# Patient Record
Sex: FEMALE | Race: WHITE | NOT HISPANIC OR LATINO
[De-identification: names, ages, dates, MRNs, and addresses within clinical notes are randomized per-mention and may not be internally consistent; named-entity substitution may affect disease eponyms.]

---

## 2017-11-07 ENCOUNTER — TRANSCRIPTION ENCOUNTER (OUTPATIENT)
Age: 67
End: 2017-11-07

## 2017-12-06 ENCOUNTER — HOSPITAL ENCOUNTER (OUTPATIENT)
Dept: HOSPITAL 74 - FASU | Age: 67
Discharge: HOME | End: 2017-12-06
Attending: OPHTHALMOLOGY
Payer: COMMERCIAL

## 2017-12-06 VITALS — HEART RATE: 62 BPM | DIASTOLIC BLOOD PRESSURE: 61 MMHG | SYSTOLIC BLOOD PRESSURE: 127 MMHG

## 2017-12-06 VITALS — TEMPERATURE: 98 F

## 2017-12-06 VITALS — BODY MASS INDEX: 28.8 KG/M2

## 2017-12-06 DIAGNOSIS — H26.8: Primary | ICD-10-CM

## 2017-12-06 PROCEDURE — 08RJ3JZ REPLACEMENT OF RIGHT LENS WITH SYNTHETIC SUBSTITUTE, PERCUTANEOUS APPROACH: ICD-10-PCS | Performed by: OPHTHALMOLOGY

## 2017-12-06 RX ADMIN — TROPICAMIDE ONE DROP: 10 SOLUTION/ DROPS OPHTHALMIC at 07:10

## 2017-12-06 RX ADMIN — CIPROFLOXACIN HYDROCHLORIDE ONE DROP: 3 SOLUTION/ DROPS OPHTHALMIC at 07:20

## 2017-12-06 RX ADMIN — CYCLOPENTOLATE HYDROCHLORIDE ONE DROP: 20 SOLUTION/ DROPS OPHTHALMIC at 07:15

## 2017-12-06 RX ADMIN — TROPICAMIDE ONE DROP: 10 SOLUTION/ DROPS OPHTHALMIC at 07:20

## 2017-12-06 RX ADMIN — CIPROFLOXACIN HYDROCHLORIDE ONE DROP: 3 SOLUTION/ DROPS OPHTHALMIC at 07:15

## 2017-12-06 RX ADMIN — CYCLOPENTOLATE HYDROCHLORIDE ONE DROP: 20 SOLUTION/ DROPS OPHTHALMIC at 07:10

## 2017-12-06 RX ADMIN — CYCLOPENTOLATE HYDROCHLORIDE ONE DROP: 20 SOLUTION/ DROPS OPHTHALMIC at 07:20

## 2017-12-06 RX ADMIN — PHENYLEPHRINE HYDROCHLORIDE ONE DROP: 0.25 SPRAY NASAL at 07:15

## 2017-12-06 RX ADMIN — PHENYLEPHRINE HYDROCHLORIDE ONE DROP: 0.25 SPRAY NASAL at 07:10

## 2017-12-06 RX ADMIN — PHENYLEPHRINE HYDROCHLORIDE ONE DROP: 0.25 SPRAY NASAL at 07:20

## 2017-12-06 RX ADMIN — TROPICAMIDE ONE DROP: 10 SOLUTION/ DROPS OPHTHALMIC at 07:15

## 2017-12-06 RX ADMIN — CIPROFLOXACIN HYDROCHLORIDE ONE DROP: 3 SOLUTION/ DROPS OPHTHALMIC at 07:10

## 2017-12-06 NOTE — OP
DATE OF OPERATION:  12/06/2017

 

OPERATIVE PROCEDURE:  Lens Phacoemulsification with Posterior Chamber Intraocular

Lens Placement Right Eye

 

PREOPERATIVE DIAGNOSIS:  Visually Significant Cataract of Right Eye

 

POSTOPERATIVE DIAGNOSIS:  Visually Significant Cataract of Right Eye

 

SURGEON:  Jerry Mijares M.D.

 

ANESTHESIA:  MAC

 

ANESTHESIOLOGIST: 

 

PROCEDURE:  The patient was brought to the operating room and placed under monitored

anesthesia care by Anesthesia.  A drop of Tetracaine was then placed over the right

eye.  The patient was then prepped and draped in the usual sterile manner.  A

speculum was then placed over the right eye. The eye was then well irrigated with

copious amounts of BSS (balanced salt solution). 

 

The operating microscope was then moved into position.  A paracentesis was performed

using a 15 degree blade.  At this point 0.5 mL of 1% preservative-free lidocaine was

injected into the anterior chamber.  Amvisc plus was then injected into the anterior

chamber.  A clear corneal incision was then formed using a 2.2 mm keratome. A

capsulorrhexis was then performed in a continuous circular fashion beginning with a

cystotome, completed with an Utratas forceps. Hydrodissection was then performed

using BSS on a cannula. The phaco probe was then introduced through the corneal wound

and the cataract was removed using the phaco chop technique.  Approximately 3 seconds

of absolute phaco time was used.  The remaining cortex was then removed using

irrigation and aspiration with an I/A probe. The capsule was then filled with regular

Amvisc and the capsule was noted to be intact.  A previously selected foldable

posterior chamber intraocular lens was then injected into the capsule through the

corneal wound using a lens injector.  It was then dialed into position using a

Sinskey hook.  The Amvisc was then removed using irrigation and aspiration.  Miostat

was then injected through the paracentesis to constrict the pupil.  The paracentesis

and corneal wound were then hydrated and noted to be water tight. A drop of Maxitrol

was then placed over the eye.  The speculum was removed and clear shield was taped

over the eye. 

 

The patient tolerated the procedure well and there were no surgical complications.

The patient was asked to follow up in my office the next day.  

 

 

JERRY MIJARES M.D.

 

[id number]

DD: 12/06/2017 08:15

DT: 12/06/2017 16:05

Job #:  [id number]

## 2018-01-10 ENCOUNTER — HOSPITAL ENCOUNTER (OUTPATIENT)
Dept: HOSPITAL 74 - FASU | Age: 68
Discharge: HOME | End: 2018-01-10
Attending: OPHTHALMOLOGY
Payer: COMMERCIAL

## 2018-01-10 VITALS — SYSTOLIC BLOOD PRESSURE: 121 MMHG | DIASTOLIC BLOOD PRESSURE: 63 MMHG | HEART RATE: 64 BPM

## 2018-01-10 VITALS — BODY MASS INDEX: 28.8 KG/M2

## 2018-01-10 VITALS — TEMPERATURE: 97.7 F

## 2018-01-10 DIAGNOSIS — H26.8: Primary | ICD-10-CM

## 2018-01-10 PROCEDURE — 08RK3JZ REPLACEMENT OF LEFT LENS WITH SYNTHETIC SUBSTITUTE, PERCUTANEOUS APPROACH: ICD-10-PCS | Performed by: OPHTHALMOLOGY

## 2018-01-10 RX ADMIN — CIPROFLOXACIN HYDROCHLORIDE ONE DROP: 3 SOLUTION/ DROPS OPHTHALMIC at 07:55

## 2018-01-10 RX ADMIN — CYCLOPENTOLATE HYDROCHLORIDE ONE DROP: 20 SOLUTION/ DROPS OPHTHALMIC at 08:00

## 2018-01-10 RX ADMIN — TROPICAMIDE ONE DROP: 10 SOLUTION/ DROPS OPHTHALMIC at 07:55

## 2018-01-10 RX ADMIN — CYCLOPENTOLATE HYDROCHLORIDE ONE DROP: 20 SOLUTION/ DROPS OPHTHALMIC at 07:50

## 2018-01-10 RX ADMIN — CYCLOPENTOLATE HYDROCHLORIDE ONE DROP: 20 SOLUTION/ DROPS OPHTHALMIC at 07:55

## 2018-01-10 RX ADMIN — CIPROFLOXACIN HYDROCHLORIDE ONE DROP: 3 SOLUTION/ DROPS OPHTHALMIC at 07:50

## 2018-01-10 RX ADMIN — PHENYLEPHRINE HYDROCHLORIDE ONE DROP: 0.25 SPRAY NASAL at 07:50

## 2018-01-10 RX ADMIN — PHENYLEPHRINE HYDROCHLORIDE ONE DROP: 0.25 SPRAY NASAL at 08:00

## 2018-01-10 RX ADMIN — PHENYLEPHRINE HYDROCHLORIDE ONE DROP: 0.25 SPRAY NASAL at 07:55

## 2018-01-10 RX ADMIN — TROPICAMIDE ONE DROP: 10 SOLUTION/ DROPS OPHTHALMIC at 07:50

## 2018-01-10 RX ADMIN — CIPROFLOXACIN HYDROCHLORIDE ONE DROP: 3 SOLUTION/ DROPS OPHTHALMIC at 08:00

## 2018-01-10 RX ADMIN — TROPICAMIDE ONE DROP: 10 SOLUTION/ DROPS OPHTHALMIC at 08:00

## 2018-01-10 NOTE — OP
DATE OF OPERATION:  01/10/2018

 

OPERATIVE PROCEDURE:  Lens Phacoemulsification with Posterior Chamber Intraocular

Lens Placement Left Eye

 

PREOPERATIVE DIAGNOSIS:  Visually Significant Cataract of Left Eye

 

POSTOPERATIVE DIAGNOSIS:  Visually Significant Cataract of Left Eye

 

SURGEON:  Jerry Mijares M.D.

 

ANESTHESIA:  MAC

 

ANESTHESIOLOGIST: 

 

PROCEDURE:  The patient was brought to the operating room and placed under monitored

anesthesia care by Anesthesia.  A drop of Tetracaine was then placed over the left

eye.  The patient was then prepped and draped in the usual sterile manner.  A

speculum was then placed over the left eye. The eye was then well irrigated with

copious amounts of BSS (balanced salt solution). 

 

The operating microscope was then moved into position.  A paracentesis was performed

using a 15 degree blade.  At this point 0.5 mL of 1% preservative-free lidocaine was

injected into the anterior chamber.  Amvisc plus was then injected into the anterior

chamber.  A clear corneal incision was then formed using a 2.2 mm keratome. A

capsulorrhexis was then performed in a continuous circular fashion beginning with a

cystotome, completed with an Utratas forceps. Hydrodissection was then performed

using BSS on a cannula. The phaco probe was then introduced through the corneal wound

and the cataract was removed using the phaco chop technique.  Approximately 3 seconds

of absolute phaco time was used.  The remaining cortex was then removed using

irrigation and aspiration with an I/A probe. The capsule was then filled with regular

Amvisc and the capsule was noted to be intact.  A previously selected foldable

posterior chamber intraocular lens was then injected into the capsule through the

corneal wound using a lens injector.  It was then dialed into position using a

Sinskey hook.  The Amvisc was then removed using irrigation and aspiration.  Miostat

was then injected through the paracentesis to constrict the pupil.  The paracentesis

and corneal wound were then hydrated and noted to be water tight. A drop of Maxitrol

was then placed over the eye.  The speculum was removed and clear shield was taped

over the eye. 

 

The patient tolerated the procedure well and there were no surgical complications.

The patient was asked to follow up in my office the next day.

 

 

JERRY MIJARES M.D.

 

[id number]

DD: 01/10/2018 09:46

DT: 01/10/2018 13:26

Job #:  [id number]

## 2019-06-21 ENCOUNTER — HOSPITAL ENCOUNTER (OUTPATIENT)
Dept: HOSPITAL 74 - JASU-ENDO | Age: 69
Discharge: HOME | End: 2019-06-21
Attending: INTERNAL MEDICINE
Payer: COMMERCIAL

## 2019-06-21 VITALS — SYSTOLIC BLOOD PRESSURE: 112 MMHG | HEART RATE: 59 BPM | DIASTOLIC BLOOD PRESSURE: 63 MMHG

## 2019-06-21 VITALS — BODY MASS INDEX: 29.9 KG/M2

## 2019-06-21 VITALS — TEMPERATURE: 97.8 F

## 2019-06-21 DIAGNOSIS — K64.8: ICD-10-CM

## 2019-06-21 DIAGNOSIS — Z71.3: ICD-10-CM

## 2019-06-21 DIAGNOSIS — Z79.84: ICD-10-CM

## 2019-06-21 DIAGNOSIS — K21.9: ICD-10-CM

## 2019-06-21 DIAGNOSIS — E66.9: ICD-10-CM

## 2019-06-21 DIAGNOSIS — D12.2: Primary | ICD-10-CM

## 2019-06-21 DIAGNOSIS — K57.30: ICD-10-CM

## 2019-06-21 DIAGNOSIS — E11.9: ICD-10-CM

## 2019-06-21 DIAGNOSIS — Z98.0: ICD-10-CM

## 2019-06-21 DIAGNOSIS — K62.89: ICD-10-CM

## 2019-06-21 DIAGNOSIS — J44.9: ICD-10-CM

## 2019-06-21 PROCEDURE — 0DBP8ZX EXCISION OF RECTUM, VIA NATURAL OR ARTIFICIAL OPENING ENDOSCOPIC, DIAGNOSTIC: ICD-10-PCS | Performed by: INTERNAL MEDICINE

## 2019-06-21 PROCEDURE — 0DBK8ZX EXCISION OF ASCENDING COLON, VIA NATURAL OR ARTIFICIAL OPENING ENDOSCOPIC, DIAGNOSTIC: ICD-10-PCS | Performed by: INTERNAL MEDICINE

## 2019-06-24 NOTE — PATH
Surgical Pathology Report



Patient Name:  PUSHPA JORDAN

Accession #:  [id number]

[facility]. Rec. #:  [id number]                                                        

   /Age/Gender:  1950 (Age: 69) / F

Account:  [id number]                                                          

             Location: ASU-ENDOSCOPY

Taken:  2019

Received:  2019

Reported:  2019

Physicians:  Lesly Patterson M.D.

  



Specimen(s) Received

A: RIGHT COLON POLYP 

B: BX SIGMOID ANASTOMOSIS 

C: BX RECTUM 





Clinical History

Proctitis, abdominal pain, rule out Colitis

Postoperative diagnosis: Diverticulosis, proctitis, right colon polyp, patent

sigmoid anastomosis







Final Diagnosis

A. RIGHT COLON POLYP, BIOPSY:

POLYPOID COLONIC MUCOSA WITH FOCAL SURFACE HYPERPLASTIC CHANGE AND REACTIVE

LYMPHOID AGGREGATES IN THE LAMINA PROPRIA.



B. SIGMOID ANASTOMOSIS, BIOPSY:

FRAGMENTS OF COLONIC MUCOSA WITH FOCAL REACTIVE LYMPHOID AGGREGATE.



C. PROCTITIS, BIOPSY:

COLONIC MUCOSA WITH ACTIVE CHRONIC INFLAMMATION, ACUTE CRYPTITIS, ARCHITECTURAL

DISTORTION, AND REACTIVE LYMPHOID FOLLICLES. SEE COMMENT.



Comment: There is no dysplasia, granuloma, parasite or viral inclusions. The

above histologic features are consistent with active chronic idiopathic

inflammatory bowel disease. This may also be seen in chronic enteric infections,

drug reactions, and parasitic infections. Clinical correlation recommended.









***Electronically Signed***

Lu Rios M.D.





Gross Description

A.  Received in formalin, labeled "biopsy right colon polyp" are 2 tan,

irregular portions of soft tissue averaging 0.4 cm. in greatest dimension. The

specimens are submitted in toto in one cassette.



B.  Received in formalin, labeled "biopsy sigmoid anastomosis" are 4 tan,

irregular portions of soft tissue averaging 0.3 cm. in greatest dimension. The

specimens are submitted in toto in one cassette.



C.  Received in formalin, labeled "biopsy proctitis" are 2 tan, irregular

portions of soft tissue measuring 0.2 and 0.3 cm. in greatest dimension. The

specimens are submitted in toto in one cassette.

## 2019-12-04 ENCOUNTER — HOSPITAL ENCOUNTER (EMERGENCY)
Dept: HOSPITAL 74 - JERFT | Age: 69
Discharge: HOME | End: 2019-12-04
Payer: COMMERCIAL

## 2019-12-04 VITALS — HEART RATE: 56 BPM | TEMPERATURE: 97.5 F | DIASTOLIC BLOOD PRESSURE: 66 MMHG | SYSTOLIC BLOOD PRESSURE: 145 MMHG

## 2019-12-04 VITALS — BODY MASS INDEX: 29.1 KG/M2

## 2019-12-04 DIAGNOSIS — K08.89: Primary | ICD-10-CM

## 2019-12-04 DIAGNOSIS — K92.9: ICD-10-CM

## 2019-12-04 DIAGNOSIS — J42: ICD-10-CM

## 2019-12-04 DIAGNOSIS — Z85.118: ICD-10-CM

## 2019-12-04 DIAGNOSIS — Z79.84: ICD-10-CM

## 2019-12-04 DIAGNOSIS — E11.9: ICD-10-CM

## 2019-12-04 DIAGNOSIS — K21.9: ICD-10-CM

## 2019-12-04 DIAGNOSIS — Z87.891: ICD-10-CM

## 2019-12-04 NOTE — PDOC
History of Present Illness





- General


Chief Complaint: Pain


Stated Complaint: SINUS INFECTION


Time Seen by Provider: 12/04/19 14:58


History Source: Patient





- History of Present Illness


Timing/Duration: other





Past History





- Past Medical History


Allergies/Adverse Reactions: 


 Allergies











Allergy/AdvReac Type Severity Reaction Status Date / Time


 


No Known Drug Allergies Allergy   Verified 12/04/19 15:01











Home Medications: 


Ambulatory Orders





Fluoxetine HCl [Prozac -] 10 mg PO BID #0 capsule 03/18/13 


Montelukast Na [Singulair -] 10 mg PO DAILY #0 tablet 03/18/13 


Sotalol HCl [Sotalol] 40 mg PO BID #0 tablet 03/18/13 


Ezetimibe [Zetia -] 10 mg PO ASDIR 07/16/15 


Simvastatin [Zocor -] 40 mg PO HS 07/16/15 


Ascorbic Acid [Vitamin C] 500 mg PO DAILY 09/22/16 


Cholecalciferol (Vitamin D3) [Vitamin D -] 2,000 unit PO DAILY 09/22/16 


Mesalamine [Apriso] 4 each PO DAILY 09/22/16 


Multivitamin with Minerals [Icaps Plus] 1 each PO DAILY 09/22/16 


metFORMIN HCL [Metformin HCl] 500 mg PO DAILY 09/22/16 


Ranitidine [Zantac -] 150 mg PO BID #180 tablet 09/23/16 








Anemia: No


Asthma: No


Cancer: Yes (RT LOBULAR CARCINOMA)


Cardiac Disorders: Yes (MVP, HX A-FIB & SVT)


CVA: No


COPD: No (CHRONIC BRONCHITIS)


CHF: No


Dementia: No


Diabetes: Yes (NIDDM)


GI Disorders: Yes (COLITIS, GERD,HYPERPLASTIC POLYP, ANAL FISSURE,)


 Disorders: No


HTN: No


Hypercholesterolemia: Yes


Liver Disease: No


Seizures: No


Thyroid Disease: No





- Surgical History


Abdominal Surgery: No


Appendectomy: Yes


Cardiac Surgery: Yes (ABLATION- 1995)


Cholecystectomy: Yes (LAPAROSCOPIC)


Lung Surgery: No


Neurologic Surgery: No


Orthopedic Surgery: No





- Psycho Social/Smoking Cessation Hx


Smoking Status: Yes


Smoking History: Never smoked


Have you smoked in the past 12 months: No


Number of Cigarettes Smoked Daily: 0


If you are a former smoker, when did you quit?: 30YRS AGO


'Breaking Loose' booklet given: 04/27/12


Hx Alcohol Use: Yes (RARE)


Drug/Substance Use Hx: No


Substance Use Type: Alcohol


Hx Substance Use Treatment: No





**Review of Systems





- Review of Systems


Constitutional: No: Chills, Fever





*Physical Exam





- Vital Signs


 Last Vital Signs











Temp Pulse Resp BP Pulse Ox


 


 97.5 F L  56 L  18   145/66   100 


 


 12/04/19 14:57  12/04/19 14:57  12/04/19 14:57  12/04/19 14:57  12/04/19 14:57














- Physical Exam


General Appearance: Yes: Appropriately Dressed.  No: Apparent Distress


HEENT: positive: Normal ENT Inspection, Normal Voice, TMs Normal, Pharynx Normal

, Other (no dental ttp or swelling).  negative: Scleral Icterus (R), Scleral 

Icterus (L), Sinus Tenderness


Neck: positive: Supple.  negative: Lymphadenopathy (R), Lymphadenopathy (L)


Respiratory/Chest: negative: Respiratory Distress


Integumentary: positive: Dry, Warm


Neurologic: positive: Fully Oriented, Alert, Normal Mood/Affect





Medical Decision Making





- Medical Decision Making





12/04/19 15:35





69-year-old female, no significant history, here with pain to the roof of her 

mouth and L upper dental sensitivity x several days.  No recent dental 

procedure and no fever, facial swelling, rhinorrhea, facial pain, headache 

fever or chills.  Patient states she was already on [employer] doing 

something else and decided to come down to the ER to get checked out





see exam





Dental pain


No e/o infection on exam


Dc to f/u with dental this week


Declines pain meds








Discharge





- Discharge Information


Problems reviewed: Yes


Clinical Impression/Diagnosis: 


 Pain, dental





Condition: Good


Disposition: HOME





- Follow up/Referral





- Patient Discharge Instructions


Additional Instructions: 


Your exam did not reveal a source for your pain but given dental sensitivity 

and pain to the roof of your mouth you should follow-up with your dentist this 

week for further evaluation


In the meantime take Motrin or Tylenol as needed





- Post Discharge Activity

## 2019-12-04 NOTE — PDOC
Rapid Medical Evaluation


Time Seen by Provider: 12/04/19 14:58


Medical Evaluation: 


 Allergies











Allergy/AdvReac Type Severity Reaction Status Date / Time


 


No Known Drug Allergies Allergy   Verified 12/29/17 13:36











12/04/19 14:58


Patient c/o: teeth psin to left side and now left ear, mild headache, no fever 

or dizziness


Patient on brief exam: vss, no acute distress 


Patient ordered for: none


Patient to proceed to the ED





**Discharge Disposition





- Diagnosis


 Pain, dental








- Discharge Dispostion


Disposition: HOME


Condition at time of disposition: Good





- Referrals





- Patient Instructions


Additional Instructions: 


Your exam did not reveal a source for your pain but given dental sensitivity 

and pain to the roof of your mouth you should follow-up with your dentist this 

week for further evaluation


In the meantime take Motrin or Tylenol as needed





- Post Discharge Activity

## 2020-01-05 ENCOUNTER — HOSPITAL ENCOUNTER (EMERGENCY)
Dept: HOSPITAL 74 - FER | Age: 70
Discharge: HOME | End: 2020-01-05
Payer: COMMERCIAL

## 2020-01-05 VITALS — HEART RATE: 79 BPM | DIASTOLIC BLOOD PRESSURE: 65 MMHG | SYSTOLIC BLOOD PRESSURE: 130 MMHG | TEMPERATURE: 97.3 F

## 2020-01-05 VITALS — BODY MASS INDEX: 29.1 KG/M2

## 2020-01-05 DIAGNOSIS — K21.9: ICD-10-CM

## 2020-01-05 DIAGNOSIS — Z87.891: ICD-10-CM

## 2020-01-05 DIAGNOSIS — J42: ICD-10-CM

## 2020-01-05 DIAGNOSIS — M79.89: Primary | ICD-10-CM

## 2020-01-05 DIAGNOSIS — E11.9: ICD-10-CM

## 2020-01-05 DIAGNOSIS — E78.00: ICD-10-CM

## 2020-01-05 LAB
ALBUMIN SERPL-MCNC: 4 G/DL (ref 3.4–5)
ALP SERPL-CCNC: 68 U/L (ref 45–117)
ALT SERPL-CCNC: 18 U/L (ref 13–61)
ANION GAP SERPL CALC-SCNC: 11 MMOL/L (ref 8–16)
AST SERPL-CCNC: 22 U/L (ref 15–37)
BASOPHILS # BLD: 0.8 % (ref 0–2)
BILIRUB SERPL-MCNC: 0.7 MG/DL (ref 0.2–1)
BUN SERPL-MCNC: 13 MG/DL (ref 7–18)
CALCIUM SERPL-MCNC: 9.3 MG/DL (ref 8.5–10)
CHLORIDE SERPL-SCNC: 101 MMOL/L (ref 98–107)
CO2 SERPL-SCNC: 24 MMOL/L (ref 21–32)
CREAT SERPL-MCNC: 0.6 MG/DL (ref 0.55–1.3)
DEPRECATED RDW RBC AUTO: 12.3 % (ref 11.6–15.6)
EOSINOPHIL # BLD: 1.3 % (ref 0–4.5)
GLUCOSE SERPL-MCNC: 104 MG/DL (ref 74–106)
HCT VFR BLD CALC: 46.9 % (ref 32.4–45.2)
HGB BLD-MCNC: 15.4 GM/DL (ref 10.7–15.3)
LYMPHOCYTES # BLD: 23.5 % (ref 8–40)
MCH RBC QN AUTO: 30.4 PG (ref 25.7–33.7)
MCHC RBC AUTO-ENTMCNC: 32.8 G/DL (ref 32–36)
MCV RBC: 92.6 FL (ref 80–96)
MONOCYTES # BLD AUTO: 5.6 % (ref 3.8–10.2)
NEUTROPHILS # BLD: 68.8 % (ref 42.8–82.8)
PLATELET # BLD AUTO: 202 K/MM3 (ref 134–434)
PMV BLD: 10.8 FL (ref 7.5–11.1)
POTASSIUM SERPLBLD-SCNC: 4.6 MMOL/L (ref 3.5–5.1)
PROT SERPL-MCNC: 7.3 G/DL (ref 6.4–8.2)
RBC # BLD AUTO: 5.06 M/MM3 (ref 3.6–5.2)
SODIUM SERPL-SCNC: 136 MMOL/L (ref 136–145)
WBC # BLD AUTO: 11.3 K/MM3 (ref 4–10.8)

## 2020-01-05 NOTE — PDOC
History of Present Illness





- General


Chief Complaint: Redness To Affected Area


Stated Complaint: RIGHT 2ND FINGER REDNESS, SWELLING


Time Seen by Provider: 01/05/20 13:39


History Source: Patient


Exam Limitations: No Limitations





- History of Present Illness


Initial Comments: 





01/05/20 14:03


69y F hx of DM, afib, gerd, hl, presents with Right finger swelling.  Patient 

states that she has been having finger swelling for several days she went to 

her primary care doctor who referred her to orthopedics. She was seen by dr. Aguilar last week, had an xray and started on augmentin.  The pt notse that her 

redness on the proximal digit had improved but the distal end appeared alittle 

darker in color. The pt denies anycurrent fever/chills, no tracking, recent 

injuries. she states she had a nodule on her dip that she would sometimes bump 

into but she doesnt recall any episodes of acut etrauma.  





pt came today to have blood work for evaluation of her finger swelling. pt has 

a follow up appointment with dr. aguilar on tuesday scheduled. 








ROS:


Constitutional - no reported Fever, Chills, 


HEENT: no reported vision changes, sore throat


Respiratory: no reported cough, sob, hemoptysis


Cardiac: no reported chest pain, palpitations, light headedness, leg swelling


Abd/GI: no reported abd pain, nausea, vomiting, blood per rectum, melena, 

diarrhea


: no reported dysuria, frequency, discharge


Musculskelatal - +R finger swelling redness and pain no reported back pain


skin - no reported bruising, erythema, rash


neurological: no reported headache, numbness, focal weakness, tingling, ataxia, 


hematologic: no reported  easy bruising, easy bleeding





Exam:


GENERAL: The patient is awake, alert, and fully oriented, Nontoxic - in no 

acute distress.


HEAD: Normocephalic, atraumatic.


EYES: extraocular movements intact, sclera anicteric, conjunctiva clear.


ENT: Normal voice,  Moist mucous membranes.


NECK: Normal range of motion, supple


LUNGS: Breath sounds equal, clear to auscultation bilaterally.  No wheezes, no 

rhonchi, no rales.


HEART: Regular rate and rhythm, normal S1 and S2 without murmur, rub or gallop.


ABDOMEN: Soft, nontender, No guarding, no rebound.  No CVA tenderness


EXTREMITIES:R 2nd digit edema/induration without significant warmth, limited 

ORM due to pain. 


NEUROLOGICAL: No facial assymetry, Normal speech, 


PSYCH: Normal mood, normal affect.


SKIN: Warm, Dry, normal turgor,











possible cullulitis vs nonspecific inflammation - no significant warmth to 

suggest infectious cause (or may be due to improvement with abx)


will ck basic labs, esr/crp and blood cx


pt had xrays performed 3 days ago, will defer imaging.











Past History





- Past Medical History


Allergies/Adverse Reactions: 


 Allergies











Allergy/AdvReac Type Severity Reaction Status Date / Time


 


No Known Drug Allergies Allergy   Verified 01/05/20 13:27











Home Medications: 


Ambulatory Orders





Fluoxetine HCl [Prozac -] 10 mg PO BID #0 capsule 03/18/13 


Montelukast Na [Singulair -] 10 mg PO DAILY #0 tablet 03/18/13 


Sotalol HCl [Sotalol] 40 mg PO BID #0 tablet 03/18/13 


Ezetimibe [Zetia -] 10 mg PO ASDIR 07/16/15 


Simvastatin [Zocor -] 40 mg PO ASDIR 07/16/15 


Ascorbic Acid [Vitamin C] 500 mg PO DAILY 09/22/16 


Cholecalciferol (Vitamin D3) [Vitamin D -] 2,000 unit PO DAILY 09/22/16 


Mesalamine [Apriso] 4 each PO DAILY 09/22/16 


Multivitamin with Minerals [Icaps Plus] 1 each PO DAILY 09/22/16 


metFORMIN HCL [Metformin HCl] 500 mg PO DAILY 09/22/16 


Ranitidine [Zantac -] 150 mg PO BID #180 tablet 09/23/16 


Amoxicillin/Potassium Clav [Augmentin 875-125 Tablet] 1 each PO BID 01/05/20 








Anemia: No


Asthma: No


Cancer: Yes (RT LOBULAR CARCINOMA)


Cardiac Disorders: Yes (MVP, HX A-FIB & SVT)


CVA: No


COPD:  (CHRONIC BRONCHITIS)


CHF: No


Dementia: No


Diabetes: Yes (NIDDM)


GI Disorders: Yes (COLITIS, GERD,HYPERPLASTIC POLYP, ANAL FISSURE,)


 Disorders: No


HTN: No


Hypercholesterolemia: Yes


Liver Disease: No


Seizures: No


Thyroid Disease: No





- Surgical History


Abdominal Surgery: No


Appendectomy: Yes


Cardiac Surgery: Yes (ABLATION- 1995)


Cholecystectomy: Yes (LAPAROSCOPIC)


Lung Surgery: No


Neurologic Surgery: No


Orthopedic Surgery: No





- Psycho Social/Smoking Cessation Hx


Smoking Status: Yes


Smoking History: Never smoked


Have you smoked in the past 12 months: No


Number of Cigarettes Smoked Daily: 0


If you are a former smoker, when did you quit?: 30YRS AGO


Information on smoking cessation initiated: No


'Breaking Loose' booklet given: 04/27/12


Hx Alcohol Use: Yes (RARE)


Drug/Substance Use Hx: No


Substance Use Type: Alcohol


Hx Substance Use Treatment: No





*Physical Exam





- Vital Signs


 Last Vital Signs











Temp Pulse Resp BP Pulse Ox


 


 97.3 F L  79   18   130/65   98 


 


 01/05/20 13:25  01/05/20 13:25  01/05/20 13:25  01/05/20 13:25  01/05/20 13:25














ED Treatment Course





- LABORATORY


CBC & Chemistry Diagram: 


 01/05/20 14:23





 01/05/20 14:23





Medical Decision Making





- Medical Decision Making





01/05/20 15:56


Patient's blood work was reviewed. noted for mild leukocytosis, but may be due 

to hemoconcentration.


ESR/CRP pending.


will dc the pt to fu with dr aguilar and continue her course of abx


return precautions were dsicussed





I discussed the physical exam findings, ancillary test results and final 

diagnoses with the patient. I answered all of the patient's questions. The 

patient was satisfied with the care received and felt comfortable with the 

discharge plan and treatment plan. The patient will call their primary care 

physician within 24 hours to arrange follow-up and will return to the Emergency 

Department with any new, persistent or worsening symptoms. 








Discharge





- Discharge Information


Problems reviewed: Yes


Clinical Impression/Diagnosis: 


 Swollen finger





Condition: Improved


Disposition: HOME





- Admission


No





- Follow up/Referral


Referrals: 


Nishant Araujo MD [Primary Care Provider] - 


Ant Aguilar MD [Staff Physician] - 





- Patient Discharge Instructions


Additional Instructions: 


Return to the emergency department immediately with ANY new, persistent or 

worsening symptoms including worsening swelling, fever, warmth, or any other 

concerns.





Your ESR/CRP is pending. Please have dr. Aguilar follow up when you see him on 

tuesday. 





You MUST call and follow up with your Dr. Aguilar as schedule on tuesdsay for 

further evaluation of your symptoms. Results were discussed with you. Please 

make sure your doctor reviews the results of your emergency evaluation. Your 

Emergency Department visit is not complete without a follow up with your doctor.





Print Language: ENGLISH





- Post Discharge Activity

## 2020-01-08 ENCOUNTER — HOSPITAL ENCOUNTER (OUTPATIENT)
Dept: HOSPITAL 74 - FASU | Age: 70
Discharge: HOME | End: 2020-01-08
Attending: ORTHOPAEDIC SURGERY
Payer: COMMERCIAL

## 2020-01-08 VITALS — BODY MASS INDEX: 29.1 KG/M2

## 2020-01-08 VITALS — TEMPERATURE: 97.7 F

## 2020-01-08 VITALS — SYSTOLIC BLOOD PRESSURE: 154 MMHG | DIASTOLIC BLOOD PRESSURE: 77 MMHG

## 2020-01-08 VITALS — HEART RATE: 71 BPM

## 2020-01-08 DIAGNOSIS — L08.89: Primary | ICD-10-CM

## 2020-01-08 PROCEDURE — 0RNW0ZZ RELEASE RIGHT FINGER PHALANGEAL JOINT, OPEN APPROACH: ICD-10-PCS | Performed by: ORTHOPAEDIC SURGERY

## 2020-01-08 PROCEDURE — 0HBFXZZ EXCISION OF RIGHT HAND SKIN, EXTERNAL APPROACH: ICD-10-PCS | Performed by: ORTHOPAEDIC SURGERY

## 2020-01-08 PROCEDURE — 0R9X0ZZ DRAINAGE OF LEFT FINGER PHALANGEAL JOINT, OPEN APPROACH: ICD-10-PCS | Performed by: ORTHOPAEDIC SURGERY

## 2020-01-08 NOTE — OP
DATE OF OPERATION:  01/08/2020

 

PREOPERATIVE DIAGNOSIS:  Right index finger infection.

 

POSTOPERATIVE DIAGNOSIS:  Right index finger infection, likely distal interphalangeal

joint septic arthritis.

 

SURGERY:  Right index finger incision and drainage with capsulotomy and irrigation

and debridement of distal interphalangeal joint with bone culture and soft tissue

cultures.

 

SURGEON:  Ant Aguilar MD 

 

ASSISTANT:  BALTAZAR Mcguire 

 

ANESTHESIA:  General.

 

COMPLICATIONS:  None.

 

ESTIMATED BLOOD LOSS:  Minimal.

 

INDICATIONS FOR PROCEDURE:  The patient is a 69-year-old female with above finding

indicated for operative treatment.  Risks, benefits, alternatives were discussed with

the patient at length.  Proper informed consent was obtained.

 

PROCEDURE:  After proper identification of patient and correct operative site,

patient was brought to the operative room, placed supine on operative table with

prominences well padded.  General anesthesia was given.  Right upper extremity was

prepped and draped in the usual sterile fashion.  Well-padded tourniquet was placed

with a sterile prep.  Arm was elevated, and the tourniquet was inflated.  No Esmarch

bandage was used.  The patient's epidermis in the area, which was desquamating, was

elevated, and pus was found underneath the epidermis.  This was cultured.  Once the

skin was removed, there was a hole just on the radial dorsal aspect of the distal

interphalangeal joint, which went down to the joint.  This had a small amount of pus

and necrotic tissue coming out of it.  This was debrided.  A mid axial incision was

then made just adjacent to this and taken through the subcutaneous tissues down to

the level of the distal interphalangeal joint.  Further cultures were taken.  Area

was thoroughly irrigated and debrided.  No further pus was noted.  The area was

copiously irrigated with saline.  Wound was sterilely dressed after loosely

approximating with 3-0 nylon suture.  The patient was reversed from anesthesia and

brought to recovery in stable condition.  She tolerated procedure well.  Arm was

placed into a splint and elevated.  Intravenous antibiotics were given after taking

cultures.  Further treatment with antibiotics will continue, and ultimate antibiotic

treatment will be determined by culture results.

 

 

RITA [id number]

DD: 01/08/2020 15:33

DT: 01/08/2020 16:13

Job #:  [id number]

## 2020-12-04 ENCOUNTER — HOSPITAL ENCOUNTER (EMERGENCY)
Dept: HOSPITAL 74 - JVIRT | Age: 70
Discharge: HOME | End: 2020-12-04
Payer: COMMERCIAL

## 2020-12-04 DIAGNOSIS — Z11.59: ICD-10-CM

## 2020-12-04 DIAGNOSIS — R07.0: Primary | ICD-10-CM

## 2020-12-04 PROCEDURE — C9803 HOPD COVID-19 SPEC COLLECT: HCPCS

## 2020-12-04 PROCEDURE — U0003 INFECTIOUS AGENT DETECTION BY NUCLEIC ACID (DNA OR RNA); SEVERE ACUTE RESPIRATORY SYNDROME CORONAVIRUS 2 (SARS-COV-2) (CORONAVIRUS DISEASE [COVID-19]), AMPLIFIED PROBE TECHNIQUE, MAKING USE OF HIGH THROUGHPUT TECHNOLOGIES AS DESCRIBED BY CMS-2020-01-R: HCPCS

## 2022-01-25 ENCOUNTER — HOSPITAL ENCOUNTER (EMERGENCY)
Dept: HOSPITAL 74 - FER | Age: 72
Discharge: HOME | End: 2022-01-25
Payer: COMMERCIAL

## 2022-01-25 VITALS — DIASTOLIC BLOOD PRESSURE: 78 MMHG | TEMPERATURE: 97.6 F | HEART RATE: 62 BPM | SYSTOLIC BLOOD PRESSURE: 142 MMHG

## 2022-01-25 VITALS — BODY MASS INDEX: 30.7 KG/M2

## 2022-01-25 DIAGNOSIS — R10.32: Primary | ICD-10-CM

## 2022-01-25 LAB
ALBUMIN SERPL-MCNC: 4 G/DL (ref 3.4–5)
ALP SERPL-CCNC: 83 U/L (ref 45–117)
ALT SERPL-CCNC: 24 U/L (ref 13–61)
ANION GAP SERPL CALC-SCNC: 9 MMOL/L (ref 8–16)
AST SERPL-CCNC: 26 U/L (ref 15–37)
BILIRUB SERPL-MCNC: 0.6 MG/DL (ref 0.2–1)
BUN SERPL-MCNC: 15 MG/DL (ref 7–18)
CALCIUM SERPL-MCNC: 9.5 MG/DL (ref 8.5–10)
CHLORIDE SERPL-SCNC: 103 MMOL/L (ref 98–107)
CO2 SERPL-SCNC: 25 MMOL/L (ref 21–32)
CREAT SERPL-MCNC: 0.8 MG/DL (ref 0.55–1.3)
DEPRECATED RDW RBC AUTO: 13.1 % (ref 11.6–15.6)
GLUCOSE SERPL-MCNC: 102 MG/DL (ref 74–106)
HCT VFR BLD CALC: 41.3 % (ref 32.4–45.2)
HGB BLD-MCNC: 13.8 GM/DL (ref 10.7–15.3)
LIPASE SERPL-CCNC: 143 U/L (ref 73–393)
MCH RBC QN AUTO: 30.1 PG (ref 25.7–33.7)
MCHC RBC AUTO-ENTMCNC: 33.5 G/DL (ref 32–36)
MCV RBC: 90 FL (ref 80–96)
PLATELET # BLD AUTO: 246 10^3/UL (ref 134–434)
PMV BLD: 9.5 FL (ref 7.5–11.1)
PROT SERPL-MCNC: 7.3 G/DL (ref 6.4–8.2)
RBC # BLD AUTO: 4.59 M/MM3 (ref 3.6–5.2)
SODIUM SERPL-SCNC: 137 MMOL/L (ref 136–145)
WBC # BLD AUTO: 7.3 K/MM3 (ref 4–10)

## 2022-01-25 PROCEDURE — 3E033GC INTRODUCTION OF OTHER THERAPEUTIC SUBSTANCE INTO PERIPHERAL VEIN, PERCUTANEOUS APPROACH: ICD-10-PCS | Performed by: EMERGENCY MEDICINE

## 2022-02-15 ENCOUNTER — NON-APPOINTMENT (OUTPATIENT)
Age: 72
End: 2022-02-15

## 2022-02-15 PROBLEM — Z00.00 ENCOUNTER FOR PREVENTIVE HEALTH EXAMINATION: Status: ACTIVE | Noted: 2022-02-15

## 2022-02-17 ENCOUNTER — NON-APPOINTMENT (OUTPATIENT)
Age: 72
End: 2022-02-17

## 2022-02-18 DIAGNOSIS — Z85.3 PERSONAL HISTORY OF MALIGNANT NEOPLASM OF BREAST: ICD-10-CM

## 2022-02-18 DIAGNOSIS — Z82.49 FAMILY HISTORY OF ISCHEMIC HEART DISEASE AND OTHER DISEASES OF THE CIRCULATORY SYSTEM: ICD-10-CM

## 2022-02-18 DIAGNOSIS — Z87.39 PERSONAL HISTORY OF OTHER DISEASES OF THE MUSCULOSKELETAL SYSTEM AND CONNECTIVE TISSUE: ICD-10-CM

## 2022-02-18 DIAGNOSIS — Z86.79 PERSONAL HISTORY OF OTHER DISEASES OF THE CIRCULATORY SYSTEM: ICD-10-CM

## 2022-02-18 DIAGNOSIS — Z78.0 ASYMPTOMATIC MENOPAUSAL STATE: ICD-10-CM

## 2022-02-18 DIAGNOSIS — Z87.898 PERSONAL HISTORY OF OTHER SPECIFIED CONDITIONS: ICD-10-CM

## 2022-02-18 DIAGNOSIS — Z98.890 OTHER SPECIFIED POSTPROCEDURAL STATES: ICD-10-CM

## 2022-02-18 DIAGNOSIS — Z83.438 FAMILY HISTORY OF OTHER DISORDER OF LIPOPROTEIN METABOLISM AND OTHER LIPIDEMIA: ICD-10-CM

## 2022-02-18 DIAGNOSIS — Z83.3 FAMILY HISTORY OF DIABETES MELLITUS: ICD-10-CM

## 2022-02-18 RX ORDER — METFORMIN HYDROCHLORIDE 500 MG/1
500 TABLET, COATED ORAL TWICE DAILY
Refills: 0 | Status: ACTIVE | COMMUNITY

## 2022-02-18 RX ORDER — SIMVASTATIN 40 MG/1
40 TABLET, FILM COATED ORAL
Refills: 0 | Status: ACTIVE | COMMUNITY

## 2022-02-18 RX ORDER — ASPIRIN ENTERIC COATED TABLETS 81 MG 81 MG/1
81 TABLET, DELAYED RELEASE ORAL
Refills: 0 | Status: ACTIVE | COMMUNITY

## 2022-02-18 RX ORDER — MESALAMINE 375 MG/1
0.38 CAPSULE, EXTENDED RELEASE ORAL
Refills: 0 | Status: ACTIVE | COMMUNITY

## 2022-02-18 RX ORDER — EZETIMIBE 10 MG/1
10 TABLET ORAL
Refills: 0 | Status: ACTIVE | COMMUNITY

## 2022-02-18 RX ORDER — SOTALOL HYDROCHLORIDE 120 MG/1
TABLET ORAL
Refills: 0 | Status: ACTIVE | COMMUNITY

## 2022-02-22 ENCOUNTER — NON-APPOINTMENT (OUTPATIENT)
Age: 72
End: 2022-02-22

## 2022-02-24 ENCOUNTER — NON-APPOINTMENT (OUTPATIENT)
Age: 72
End: 2022-02-24

## 2022-02-24 ENCOUNTER — APPOINTMENT (OUTPATIENT)
Dept: HEART AND VASCULAR | Facility: CLINIC | Age: 72
End: 2022-02-24
Payer: MEDICARE

## 2022-02-24 ENCOUNTER — APPOINTMENT (OUTPATIENT)
Dept: HEART AND VASCULAR | Facility: CLINIC | Age: 72
End: 2022-02-24

## 2022-02-24 VITALS
HEIGHT: 65 IN | WEIGHT: 188 LBS | OXYGEN SATURATION: 98 % | BODY MASS INDEX: 31.32 KG/M2 | SYSTOLIC BLOOD PRESSURE: 98 MMHG | TEMPERATURE: 97.9 F | DIASTOLIC BLOOD PRESSURE: 52 MMHG | HEART RATE: 54 BPM | RESPIRATION RATE: 12 BRPM

## 2022-02-24 DIAGNOSIS — Z78.9 OTHER SPECIFIED HEALTH STATUS: ICD-10-CM

## 2022-02-24 DIAGNOSIS — I21.4 NON-ST ELEVATION (NSTEMI) MYOCARDIAL INFARCTION: ICD-10-CM

## 2022-02-24 DIAGNOSIS — E11.9 TYPE 2 DIABETES MELLITUS W/OUT COMPLICATIONS: ICD-10-CM

## 2022-02-24 PROCEDURE — 99205 OFFICE O/P NEW HI 60 MIN: CPT

## 2022-02-24 PROCEDURE — 93000 ELECTROCARDIOGRAM COMPLETE: CPT

## 2022-02-24 RX ORDER — QUINIDINE SULFATE 200 MG
TABLET ORAL DAILY
Refills: 0 | Status: ACTIVE | COMMUNITY
Start: 2022-02-24

## 2022-02-24 RX ORDER — MONTELUKAST SODIUM 10 MG/1
10 TABLET, FILM COATED ORAL
Refills: 0 | Status: ACTIVE | COMMUNITY
Start: 2022-02-24

## 2022-02-24 NOTE — REASON FOR VISIT
[Arrhythmia/ECG Abnorrmalities] : arrhythmia/ECG abnormalities [Hyperlipidemia] : hyperlipidemia [Hypertension] : hypertension [Coronary Artery Disease] : coronary artery disease [Carotid, Aortic and Peripheral Vascular Disease] : carotid, aortic and peripheral vascular disease [FreeTextEntry3] : Heri Andrade

## 2022-02-24 NOTE — DISCUSSION/SUMMARY
[Bundle Branch Block] : ~T bundle branch block [Paroxysmal Atrial Fibrillation] : paroxysmal atrial fibrillation [Coronary Artery Disease] : coronary artery disease [Weight Reduction] : weight reduction [Hyperlipidemia] : hyperlipidemia [Diet Modification] : diet modification [Exercise] : exercise [Hypertension] : hypertension [Responding to Treatment] : responding to treatment [Medication Changes Per Orders] : Medication changes are as documented in orders [Exercise Regimen] : an exercise regimen [Dietary Modification] : dietary modification [Weight Loss] : weight loss [Low Sodium Diet] : low sodium diet [Peripheral Vascular Disease] : peripheral vascular disease [Stable] : stable [None] : There are no changes in medication management [Exercise Rehab] : exercise rehabilitation [Patient] : the patient [de-identified] : remote last episode; DOAC not used due to bleeding risk in setting of colitis hx [de-identified] : nonobstructive disease by cardiac cath 2/2022; NSTEMI 2/2022 [de-identified] : min carotid plaque b/l

## 2022-02-24 NOTE — HISTORY OF PRESENT ILLNESS
[FreeTextEntry1] : Vicky Ovalles is a 72 yo female who presents for CV evaluation.\par \par In late January and early February, she was evaluated for abdominal pain and treated for UTI.  Her antibiotic regimen for UTI was changed to address organism sensitivities.  She developed red and itchy palms, took Benadryl, and then developed midsternal chest discomfort with elevated BP.  She presented to [facility] where she found to have increasing troponin levels.  She was transferred to [facility] where she underwent cardiac cath.  Cath revealed nl LVEF (55%) with LVEDP 11mmHg, 30% LAD/RI, min CX disease, 40-50% RCA lesions.  Medical management was recommended for presumed Type II MI (ACE inh was started).\par \par She is mourning the loss of her .\par \par She denies cp, sob, pnd, orthopnea, edema, palp, or loc.\par \par She is increasing her activity.  She is compliant with meds.\par \par ECG today reveals NSR, LBBB.\par \par Clinical hx reviewed in detail.\par \par Recommendations:\par 1. continue current meds (no DOAC at ths time - risk for bleeding setting of colitis hx); t/c SGLT-2 inh\par 2. Mediterranean diet\par 3. exercise\par 4. TTE with bubble and strain\par 5. f/u in 3 months

## 2022-03-01 ENCOUNTER — APPOINTMENT (OUTPATIENT)
Dept: NEUROLOGY | Facility: CLINIC | Age: 72
End: 2022-03-01
Payer: MEDICARE

## 2022-03-01 VITALS
SYSTOLIC BLOOD PRESSURE: 148 MMHG | WEIGHT: 185 LBS | HEART RATE: 60 BPM | BODY MASS INDEX: 30.82 KG/M2 | DIASTOLIC BLOOD PRESSURE: 80 MMHG | HEIGHT: 65 IN

## 2022-03-01 DIAGNOSIS — R29.6 REPEATED FALLS: ICD-10-CM

## 2022-03-01 DIAGNOSIS — R29.2 ABNORMAL REFLEX: ICD-10-CM

## 2022-03-01 DIAGNOSIS — R26.89 OTHER ABNORMALITIES OF GAIT AND MOBILITY: ICD-10-CM

## 2022-03-01 PROCEDURE — 99205 OFFICE O/P NEW HI 60 MIN: CPT

## 2022-03-01 NOTE — CONSULT LETTER
[Dear  ___] : Dear  [unfilled], [Consult Letter:] : I had the pleasure of evaluating your patient, [unfilled]. [Please see my note below.] : Please see my note below. [FreeTextEntry3] : Sincerely,\par \par Arnie Arrington M.D.\par

## 2022-03-01 NOTE — PHYSICAL EXAM
[FreeTextEntry1] : Physical examination \par General: No acute distress, Awake, Alert. \par \par Mental status \par Awake, alert, and oriented to person, time and place, Normal attention span and concentration, Recent and remote memory intact, Language intact, Fund of knowledge intact. \par Cranial Nerves \par II: VFF \par III, IV, VI: PERRL, EOMI. \par V: Facial sensation is normal B/L. \par VII: Facial strength is normal B/L. \par VIII: Gross hearing is intact. \par IX, X: Palate is midline and elevates symmetrically. \par XI: Trapezius normal strength. \par XII: Tongue midline without atrophy or fasciculations. \par \par Motor exam \par BUE normal strength\par BLE - proximal 4+/5 is weak\par distal strength is nomral\par \par Reflexes \par diffusely brisk\par Plantars right: mute. \par Plantars left: mute. \par \par Coordination \par Finger to nose: Normal. \par Heel to shin: some difficulty bilaterally\par \par Sensory \par Impaired vibration; reduced pp; Romberg positive\par \par Gait \par Wide-based gait and very cautious\par

## 2022-03-01 NOTE — ASSESSMENT
[FreeTextEntry1] : Patient has proximal lower extremity weakness and brisk reflexes.  \par Differential includes vascular etiology due to sudden onset of symptoms, deconditioning, and or worsening neuropathy.\par Her hemoglobin A1c was elevated.  She tells me she often forgets to take the second dose of Metformin.\par Advised her to speak with her primary care physician regarding extended release dosing.\par \par She needs imaging as outlined below.\par I will order EMG/NCV.\par Labs as outlined below.\par \par She will begin physical therapy.\par Further recommendations based on test results.

## 2022-03-01 NOTE — HISTORY OF PRESENT ILLNESS
[FreeTextEntry1] : This is a 71-year-old woman who is being seen in neurologic consultation for evaluation of problems with balance.  She has noticed this problems over the last few months.  She notes no significant neck pain.  She has no significant low back or hip pain.  She has no tremors or increased stiffness in her arms.  She does notice that her left leg seems to be dragging more.  She feels that there is more stiffness in the left leg.  She has bilateral numbness in the feet which seems to be worse on the left compared to the right.  There is no stabbing or shooting pain in her feet.  She had been taking care of her  who recently passed away on January 30.  She had not been as active as before.\par \par This past Saturday she actually fell down.  She fell sideways and backwards.  She describes feeling like she was pulled backwards.  She did not injure her head.  She fell on her buttock.  This is not the first time this has happened.  She has become very self-conscious while walking.  She describes her walk as someone who is drunk.\par \par On February 12 she had a MI for which she was taken to [facility] for cardiac catheterization.

## 2022-03-02 LAB
FOLATE SERPL-MCNC: >20 NG/ML
TSH SERPL-ACNC: 2.6 UIU/ML
VIT B12 SERPL-MCNC: 731 PG/ML

## 2022-03-04 ENCOUNTER — TRANSCRIPTION ENCOUNTER (OUTPATIENT)
Age: 72
End: 2022-03-04

## 2022-03-06 ENCOUNTER — TRANSCRIPTION ENCOUNTER (OUTPATIENT)
Age: 72
End: 2022-03-06

## 2022-03-09 ENCOUNTER — NON-APPOINTMENT (OUTPATIENT)
Age: 72
End: 2022-03-09

## 2022-03-09 LAB — VIT B6 SERPL-MCNC: 74.9 UG/L

## 2022-03-14 ENCOUNTER — NON-APPOINTMENT (OUTPATIENT)
Age: 72
End: 2022-03-14

## 2022-03-14 LAB — VIT B1 SERPL-MCNC: 218.8 NMOL/L

## 2022-03-28 ENCOUNTER — NON-APPOINTMENT (OUTPATIENT)
Age: 72
End: 2022-03-28

## 2022-03-28 ENCOUNTER — APPOINTMENT (OUTPATIENT)
Dept: GASTROENTEROLOGY | Facility: CLINIC | Age: 72
End: 2022-03-28
Payer: MEDICARE

## 2022-03-28 VITALS
SYSTOLIC BLOOD PRESSURE: 122 MMHG | WEIGHT: 185 LBS | BODY MASS INDEX: 30.82 KG/M2 | HEART RATE: 61 BPM | TEMPERATURE: 97.1 F | DIASTOLIC BLOOD PRESSURE: 72 MMHG | HEIGHT: 65 IN

## 2022-03-28 DIAGNOSIS — K52.9 NONINFECTIVE GASTROENTERITIS AND COLITIS, UNSPECIFIED: ICD-10-CM

## 2022-03-28 DIAGNOSIS — K21.9 GASTRO-ESOPHAGEAL REFLUX DISEASE W/OUT ESOPHAGITIS: ICD-10-CM

## 2022-03-28 PROCEDURE — 99204 OFFICE O/P NEW MOD 45 MIN: CPT

## 2022-03-28 NOTE — HISTORY OF PRESENT ILLNESS
[FreeTextEntry1] : Ms. Ovalles is a pleasant 72F h/o diverticulitis (multiple episodes) s/p elective partial colonic resection 2013, colitis on Apriso (ischemic??), NSTEMI, breast CA s/p resection, a. fib (remote last episode, not on AC), nonobstructive CAD, HLD, HTN who presents to establish care.\par \par She was previously cared for by Dr. 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 \par \par She had multiple bouts of diverticulitis starting around age 42. She eventually underwent an elective partial colonic resection in 2013.  Thereafter she developed ischemic colitis for which she was treated with Apriso (will try to have records faxed). \par \par She has had bouts of diarrhea depending on what she eats, this has improved slightly with fiber supplements and probiotics.\par \par Last colonoscopy was possibly 2015, however she is unsure.\par \par No rectal bleeding or mucus.\par \par She has had worsening heartburn over the past year or so, she was started on pantoprazole 40mg daily by her PCP which helped, however she has frequent breakthrough symptoms for which she uses Tums, mylanta.\par \par Last EGD was in the distant past.\par \par No dysphagia or odynophagia.\par \par No weight loss.\par \par Does not smoke or drink.\par \par Labs 2/12/22:\par WBC 9.5\par H/H 14.1/43.6\par Plat 261\par CMET normal other than glucose 118

## 2022-03-28 NOTE — ASSESSMENT
[FreeTextEntry1] : Will continue pantoprazole 40mg daily.\par \par Will use pepcid 20mg up to twice daily PRN, and gaviscon PRN for breakthrough symptoms.\par \par Will plan on an upper endoscopy for GERD.  Explained risks/benefits/alternatives including not limited to bleeding, infection, perforation, missed lesions, anesthesia complications.  Patient understands and agrees, all questions answered.\par \par Will plan on a colonoscopy for screening, colitis.  Explained risks/benefits/alternatives including not limited to bleeding, infection, perforation, missed lesions, anesthesia complications.  Patient understands and agrees, all questions answered.  Will use a split dose miralax/gatorade prep with clears the day prior.\par \par Will first have her prior records faxed if possible.

## 2022-03-31 ENCOUNTER — TRANSCRIPTION ENCOUNTER (OUTPATIENT)
Age: 72
End: 2022-03-31

## 2022-04-07 ENCOUNTER — APPOINTMENT (OUTPATIENT)
Dept: HEART AND VASCULAR | Facility: CLINIC | Age: 72
End: 2022-04-07
Payer: MEDICARE

## 2022-04-07 PROCEDURE — 93306 TTE W/DOPPLER COMPLETE: CPT

## 2022-05-19 ENCOUNTER — APPOINTMENT (OUTPATIENT)
Dept: HEART AND VASCULAR | Facility: CLINIC | Age: 72
End: 2022-05-19
Payer: MEDICARE

## 2022-05-19 VITALS
TEMPERATURE: 96.5 F | HEART RATE: 72 BPM | SYSTOLIC BLOOD PRESSURE: 118 MMHG | RESPIRATION RATE: 12 BRPM | DIASTOLIC BLOOD PRESSURE: 68 MMHG | BODY MASS INDEX: 30.82 KG/M2 | WEIGHT: 185 LBS | HEIGHT: 65 IN | OXYGEN SATURATION: 100 %

## 2022-05-19 PROCEDURE — 99215 OFFICE O/P EST HI 40 MIN: CPT

## 2022-05-19 RX ORDER — METHYLPREDNISOLONE 4 MG/1
4 TABLET ORAL
Qty: 1 | Refills: 0 | Status: DISCONTINUED | COMMUNITY
Start: 2022-03-28 | End: 2022-05-19

## 2022-05-20 NOTE — DISCUSSION/SUMMARY
[Bundle Branch Block] : ~T bundle branch block [Paroxysmal Atrial Fibrillation] : paroxysmal atrial fibrillation [Coronary Artery Disease] : coronary artery disease [Weight Reduction] : weight reduction [Hyperlipidemia] : hyperlipidemia [Diet Modification] : diet modification [Exercise] : exercise [Hypertension] : hypertension [Exercise Regimen] : an exercise regimen [Dietary Modification] : dietary modification [Weight Loss] : weight loss [Low Sodium Diet] : low sodium diet [Peripheral Vascular Disease] : peripheral vascular disease [Stable] : stable [None] : There are no changes in medication management [Exercise Rehab] : exercise rehabilitation [Patient] : the patient [de-identified] : remote last episode; DOAC not used due to bleeding risk in setting of colitis hx [de-identified] : nonobstructive disease by cardiac cath 2/2022; NSTEMI 2/2022 [de-identified] : min carotid plaque b/l

## 2022-05-20 NOTE — HISTORY OF PRESENT ILLNESS
[FreeTextEntry1] : Vicky Ovalles returns for follow up.  \par \par In late January and early February, she was evaluated for abdominal pain and treated for UTI.  Her antibiotic regimen for UTI was changed to address organism sensitivities.  She developed red and itchy palms, took Benadryl, and then developed midsternal chest discomfort with elevated BP.  She presented to [facility] where she found to have increasing troponin levels.  She was transferred to [facility] where she underwent cardiac cath.  Cath revealed nl LVEF (55%) with LVEDP 11mmHg, 30% LAD/RI, min CX disease, 40-50% RCA lesions.  Medical management was recommended for presumed Type II MI (ACE inh was started).\par \par Today, she denies cp, sob, pnd, orthopnea, edema, palp, or loc.\par \par She is active.  She is compliant with meds.\par \par TTE 4/2022: nl lv sys fxn; nl dubose fxn; no evidence of  shunt \par \par Clinical hx and results reviewed in detail.\par \par Recommendations:\par 1. continue current meds (no DOAC at ths time - risk for bleeding setting of colitis hx); t/c SGLT-2 inh\par 2. Mediterranean diet\par 3. exercise\par 4. f/u in 6 months

## 2022-05-28 ENCOUNTER — RESULT REVIEW (OUTPATIENT)
Age: 72
End: 2022-05-28

## 2022-06-02 ENCOUNTER — APPOINTMENT (OUTPATIENT)
Dept: NEUROLOGY | Facility: CLINIC | Age: 72
End: 2022-06-02
Payer: MEDICARE

## 2022-06-02 VITALS
SYSTOLIC BLOOD PRESSURE: 132 MMHG | TEMPERATURE: 98.7 F | WEIGHT: 185 LBS | BODY MASS INDEX: 30.82 KG/M2 | OXYGEN SATURATION: 98 % | HEART RATE: 58 BPM | HEIGHT: 65 IN | DIASTOLIC BLOOD PRESSURE: 74 MMHG

## 2022-06-02 VITALS
BODY MASS INDEX: 30.53 KG/M2 | HEART RATE: 84 BPM | WEIGHT: 190 LBS | HEIGHT: 66 IN | SYSTOLIC BLOOD PRESSURE: 143 MMHG | DIASTOLIC BLOOD PRESSURE: 87 MMHG

## 2022-06-02 PROCEDURE — 99214 OFFICE O/P EST MOD 30 MIN: CPT

## 2022-06-02 RX ORDER — CYCLOBENZAPRINE HYDROCHLORIDE 5 MG/1
5 TABLET, FILM COATED ORAL AT BEDTIME
Qty: 30 | Refills: 1 | Status: ACTIVE | COMMUNITY
Start: 2022-06-02 | End: 1900-01-01

## 2022-06-10 NOTE — PHYSICAL EXAM
[FreeTextEntry1] : Physical examination \par General: No acute distress, Awake, Alert. \par \par Mental status \par Awake, alert, and oriented to person, time and place, Normal attention span and concentration, Recent and remote memory intact, Language intact, Fund of knowledge intact. \par Cranial Nerves \par II: VFF \par III, IV, VI: PERRL, EOMI. \par V: Facial sensation is normal B/L. \par VII: Facial strength is normal B/L. \par VIII: Gross hearing is intact. \par IX, X: Palate is midline and elevates symmetrically. \par XI: Trapezius normal strength. \par XII: Tongue midline without atrophy or fasciculations. \par \par Motor exam \par BUE normal strength\par BLE - Proximal 5/5\par distal strength is normal\par \par Reflexes \par diffusely brisk\par Plantars right: mute. \par Plantars left: mute. \par \par Coordination \par Finger to nose: Normal. \par Heel to shin: some difficulty bilaterally\par \par Sensory \par Impaired vibration; reduced pp; Romberg positive\par \par Gait \par Wide-based gait and very cautious\par

## 2022-06-10 NOTE — DATA REVIEWED
[de-identified] : \par  [facility] MRI Report             Final\par \par No Documents Attached\par \par \par \par \par   [facility]\par                                          [address]\par                                    [place], [place]  [address]\par                                        Department of Radiology\par                                             [phone]\par \par \par Patient Name:      VA HALEY                Location:       [facility]\par Med Rec #:        [id number]                    Account #:      [id number]\par YOB: 1950                    Ordering:       Enriqueta Arrington MD\par Age: 72               Sex:    F                 Attending:      Enriqueta Arrington MD\par PCP:        Heri Andrade MD\par ______________________________________________________________________________________\par \par Exam Date:      05/28/22\par Exam:         MRI LUMBAR SPINE WAW \par Order#:       [id number]\par \par \par \par EXAM: MRI lumbar spine without contrast\par \par  INDICATION: Lumbar back pain\par \par  TECHNIQUE: MR exam of lumbar spine with and without contrast utilizing sagittal/axial T1\par postcontrast coronal T2, sagittal T2 STIR, sagittal T2, sagittal T1, axial T1, axial T2 sequences\par \par  7.5 mL Gadavist administered intravenously.\par \par  COMPARISON: None available.\par \par  FINDINGS:\par \par \par \par  When counting inferiorly from craniocervical junction on total spine localizer sequences, there\par appear to be 7 cervical type, 12 thoracic-type and 5 lumbar type vertebral bodies. For purposes of\par this report, vertebral body at level of the iliolumbar ligament will be designated as L5.\par Inferiormost well-formed intervertebral disc space will be designated as L5-S1. No MR evidence for\par transitional lumbosacral anatomy.\par \par \par  Lumbar lordosis is maintained. No significant spondylolisthesis. Vertebral body heights are\par maintained. Vertebral body bone marrow signal within normal limits. No abnormal cord signal\par identified. Conus terminates at L1. No significant periarticular or paraspinal soft tissue edema is\par identified. There are multilevel degenerative endplate changes with osteophyte formation,\par intervertebral disc space narrowing/desiccation, Schmorl's nodes and endplate irregularity, most\par prominently at L1-L2 and to a lesser degree at L3-L4 where there are degenerative endplate edematous\par changes, Modic type I with associated vertebral body enhancement. No suspicious expansile or\par destructive osseous lesion identified. Paraspinal soft tissues are unremarkable.\par \par  There are multilevel findings as follows:\par \par  Disc bulge, bilateral facet arthropathy, ligamentous hypertrophy contributing to mild canal\par stenosis and mild bilateral foraminal stenosis.\par \par  T12-L1: No significant canal or foraminal stenosis.\par \par  L1-L2: No significant canal or foraminal stenosis.\par \par  L2-L3: No significant canal or foraminal stenosis.\par \par  L3-L4: Disc bulge, bilateral facet arthropathy, ligamentous hypertrophy contributing to no\par significant canal stenosis and mild bilateral foraminal stenosis.\par \par  L4-L5: Disc bulge, bilateral facet arthropathy, ligamentous hypertrophy contributing to no\par significant canal stenosis and moderate bilateral foraminal stenosis.\par \par  L5-S1: Disc bulge, bilateral facet arthropathy, ligamentous hypertrophy contributing to no\par significant canal stenosis and mild to moderate bilateral foraminal stenosis.\par \par \par \par  IMPRESSION:\par \par  Multilevel lumbar spondylitic changes as detailed above notable for mild to moderate bilateral\par foraminal stenosis at L3-L4, L4-L5 and L5-S1 involving exiting L3, L4, L5 nerve roots, respectively.\par No significant canal stenosis of the lumbar spine.\par \par  Multilevel degenerative endplate changes most prominently at L1-L2 and L3-L4, where are\par degenerative endplate edematous changes, Modic type I with associated vertebral body enhancement.\par \par \par  If there are questions/need for clarification regarding this report, Dr. Delonte Gong can be best\par reached via the patient [facility] [employer] system at [place] [email or web address].\par \par  --- End of Report ---\par \par ***Electronically Signed ***\par -----------------------------------------------\par Delonte Whitmore MD              05/31/22 1822\par \par Dictated on 05/31/22\par \par \par Report cc:  Enriqueta Arrington MD;\par \par  \par \par  Ordered by: ENRIQUETA ARRINGTON       Collected/Examined: [id number] 11:00AM       \par Verified by: ENRIQUETA ARRINGTON 01Jun2022 12:24PM       \par  Result Communication: No patient communication needed at this time;\par Stage: Final       \par  Performed at: [facility]       Resulted: [id number] 06:22PM       Last Updated: 01Jun2022 12:24PM       Accession: [id number]

## 2022-06-10 NOTE — HISTORY OF PRESENT ILLNESS
[FreeTextEntry1] : Notes left facial pain since 1/21\par implant in 10/20 \par left v2 when its severe its hard to identify\par describes a throbbing pain\par doesn’t prevent chewing \par not sensitive to hot and cold\par no temporal pattern\par seen dentist, endodontist\par Has to take Advil and Tylenol 2-3 times a day\par \par Low back pain with radiation into left leg.\par Left greater than right. \par Pain improved worse when first she wakes up. \par Doing PT\par \par Flexeril 5 mg at night helps- avoid during day bc of fatigue.\par Motrin or Tylenol helps with the pain\par \par Reviewed MR Brain, c-spine, L- spine\par \par Last A1c is better\par \par 3/1/22\par This is a 71-year-old woman who is being seen in neurologic consultation for evaluation of problems with balance.  She has noticed this problems over the last few months.  She notes no significant neck pain.  She has no significant low back or hip pain.  She has no tremors or increased stiffness in her arms.  She does notice that her left leg seems to be dragging more.  She feels that there is more stiffness in the left leg.  She has bilateral numbness in the feet which seems to be worse on the left compared to the right.  There is no stabbing or shooting pain in her feet.  She had been taking care of her  who recently passed away on January 30.  She had not been as active as before.\par \par This past Saturday she actually fell down.  She fell sideways and backwards.  She describes feeling like she was pulled backwards.  She did not injure her head.  She fell on her buttock.  This is not the first time this has happened.  She has become very self-conscious while walking.  She describes her walk as someone who is drunk.\par \par On February 12 she had a MI for which she was taken to [facility] for cardiac catheterization.

## 2022-06-10 NOTE — ASSESSMENT
[FreeTextEntry1] : Stop multi-vitamin\par Begin gabapentin\par \par PT finished- maintain exercises at home.\par \par EMG pending.\par \par f/u after test complete.

## 2022-06-16 ENCOUNTER — APPOINTMENT (OUTPATIENT)
Dept: NEUROLOGY | Facility: CLINIC | Age: 72
End: 2022-06-16
Payer: MEDICARE

## 2022-06-16 DIAGNOSIS — G56.02 CARPAL TUNNEL SYNDROME, LEFT UPPER LIMB: ICD-10-CM

## 2022-06-16 DIAGNOSIS — R20.2 PARESTHESIA OF SKIN: ICD-10-CM

## 2022-06-16 PROCEDURE — 95886 MUSC TEST DONE W/N TEST COMP: CPT

## 2022-06-16 PROCEDURE — 95911 NRV CNDJ TEST 9-10 STUDIES: CPT

## 2022-06-16 PROCEDURE — 99214 OFFICE O/P EST MOD 30 MIN: CPT | Mod: 25

## 2022-06-20 PROBLEM — G56.02 ENTRAPMENT OF LEFT MEDIAN NERVE: Status: ACTIVE | Noted: 2022-06-20

## 2022-06-20 PROBLEM — R20.2 PARESTHESIAS: Status: ACTIVE | Noted: 2022-03-01

## 2022-06-20 NOTE — DATA REVIEWED
[de-identified] : \par  [facility] MRI Report             Final\par \par No Documents Attached\par \par \par \par \par   [facility]\par                                          [address]\par                                    [place], [place]  [address]\par                                        Department of Radiology\par                                             [phone]\par \par \par Patient Name:      VA HALEY                Location:       [facility]\par Med Rec #:        [id number]                    Account #:      [id number]\par YOB: 1950                    Ordering:       Enriqueta Arrington MD\par Age: 72               Sex:    F                 Attending:      Enriqueta Arrington MD\par PCP:        Heri Andrade MD\par ______________________________________________________________________________________\par \par Exam Date:      05/28/22\par Exam:         MRI LUMBAR SPINE WAW \par Order#:       MRI [id number]\par \par \par \par EXAM: MRI lumbar spine without contrast\par \par  INDICATION: Lumbar back pain\par \par  TECHNIQUE: MR exam of lumbar spine with and without contrast utilizing sagittal/axial T1\par postcontrast coronal T2, sagittal T2 STIR, sagittal T2, sagittal T1, axial T1, axial T2 sequences\par \par  7.5 mL Gadavist administered intravenously.\par \par  COMPARISON: None available.\par \par  FINDINGS:\par \par \par \par  When counting inferiorly from craniocervical junction on total spine localizer sequences, there\par appear to be 7 cervical type, 12 thoracic-type and 5 lumbar type vertebral bodies. For purposes of\par this report, vertebral body at level of the iliolumbar ligament will be designated as L5.\par Inferiormost well-formed intervertebral disc space will be designated as L5-S1. No MR evidence for\par transitional lumbosacral anatomy.\par \par \par  Lumbar lordosis is maintained. No significant spondylolisthesis. Vertebral body heights are\par maintained. Vertebral body bone marrow signal within normal limits. No abnormal cord signal\par identified. Conus terminates at L1. No significant periarticular or paraspinal soft tissue edema is\par identified. There are multilevel degenerative endplate changes with osteophyte formation,\par intervertebral disc space narrowing/desiccation, Schmorl's nodes and endplate irregularity, most\par prominently at L1-L2 and to a lesser degree at L3-L4 where there are degenerative endplate edematous\par changes, Modic type I with associated vertebral body enhancement. No suspicious expansile or\par destructive osseous lesion identified. Paraspinal soft tissues are unremarkable.\par \par  There are multilevel findings as follows:\par \par  Disc bulge, bilateral facet arthropathy, ligamentous hypertrophy contributing to mild canal\par stenosis and mild bilateral foraminal stenosis.\par \par  T12-L1: No significant canal or foraminal stenosis.\par \par  L1-L2: No significant canal or foraminal stenosis.\par \par  L2-L3: No significant canal or foraminal stenosis.\par \par  L3-L4: Disc bulge, bilateral facet arthropathy, ligamentous hypertrophy contributing to no\par significant canal stenosis and mild bilateral foraminal stenosis.\par \par  L4-L5: Disc bulge, bilateral facet arthropathy, ligamentous hypertrophy contributing to no\par significant canal stenosis and moderate bilateral foraminal stenosis.\par \par  L5-S1: Disc bulge, bilateral facet arthropathy, ligamentous hypertrophy contributing to no\par significant canal stenosis and mild to moderate bilateral foraminal stenosis.\par \par \par \par  IMPRESSION:\par \par  Multilevel lumbar spondylitic changes as detailed above notable for mild to moderate bilateral\par foraminal stenosis at L3-L4, L4-L5 and L5-S1 involving exiting L3, L4, L5 nerve roots, respectively.\par No significant canal stenosis of the lumbar spine.\par \par  Multilevel degenerative endplate changes most prominently at L1-L2 and L3-L4, where are\par degenerative endplate edematous changes, Modic type I with associated vertebral body enhancement.\par \par \par  If there are questions/need for clarification regarding this report, Dr. Delonte Gong can be best\par reached via the patient [facility] [employer] system at [place] [email or web address].\par \par  --- End of Report ---\par \par ***Electronically Signed ***\par -----------------------------------------------\par Dleonte Whitmore MD              05/31/22 1822\par \par Dictated on 05/31/22\par \par \par Report cc:  Enriqueta Arrington MD;\par \par  \par \par  Ordered by: ENRIQUETA ARRINGTON       Collected/Examined: [id number] 11:00AM       \par Verified by: ENRIQUETA ARRINGTON 01Jun2022 12:24PM       \par  Result Communication: No patient communication needed at this time;\par Stage: Final       \par  Performed at: [facility]       Resulted: [id number] 06:22PM       Last Updated: 01Jun2022 12:24PM       Accession: [id number]

## 2022-06-20 NOTE — HISTORY OF PRESENT ILLNESS
[FreeTextEntry1] : Pain improved with gabapentin\par \par Walking one mile per day!\par Feels stronger\par \par 6/2/22\par Notes left facial pain since 1/21\par implant in 10/20 \par left v2 when its severe its hard to identify\par describes a throbbing pain\par doesn’t prevent chewing \par not sensitive to hot and cold\par no temporal pattern\par seen dentist, endodontist\par Has to take Advil and Tylenol 2-3 times a day\par \par Low back pain with radiation into left leg.\par Left greater than right. \par Pain improved worse when first she wakes up. \par Doing PT\par \par Flexeril 5 mg at night helps- avoid during day bc of fatigue.\par Motrin or Tylenol helps with the pain\par \par Reviewed MR Brain, c-spine, L- spine\par \par Last A1c is better\par \par 3/1/22\par This is a 71-year-old woman who is being seen in neurologic consultation for evaluation of problems with balance.  She has noticed this problems over the last few months.  She notes no significant neck pain.  She has no significant low back or hip pain.  She has no tremors or increased stiffness in her arms.  She does notice that her left leg seems to be dragging more.  She feels that there is more stiffness in the left leg.  She has bilateral numbness in the feet which seems to be worse on the left compared to the right.  There is no stabbing or shooting pain in her feet.  She had been taking care of her  who recently passed away on January 30.  She had not been as active as before.\par \par This past Saturday she actually fell down.  She fell sideways and backwards.  She describes feeling like she was pulled backwards.  She did not injure her head.  She fell on her buttock.  This is not the first time this has happened.  She has become very self-conscious while walking.  She describes her walk as someone who is drunk.\par \par On February 12 she had a MI for which she was taken to [facility] for cardiac catheterization.

## 2022-06-20 NOTE — DATA REVIEWED
[de-identified] : \par  [facility] MRI Report             Final\par \par No Documents Attached\par \par \par \par \par   [facility]\par                                          [address]\par                                    [place], [place]  [address]\par                                        Department of Radiology\par                                             [phone]\par \par \par Patient Name:      VA HALEY                Location:       [facility]\par Med Rec #:        [id number]                    Account #:      [id number]\par YOB: 1950                    Ordering:       Enriqueta Arrington MD\par Age: 72               Sex:    F                 Attending:      Enriqueta Arrington MD\par PCP:        Heri Andrade MD\par ______________________________________________________________________________________\par \par Exam Date:      05/28/22\par Exam:         MRI LUMBAR SPINE WAW \par Order#:       [id number]\par \par \par \par EXAM: MRI lumbar spine without contrast\par \par  INDICATION: Lumbar back pain\par \par  TECHNIQUE: MR exam of lumbar spine with and without contrast utilizing sagittal/axial T1\par postcontrast coronal T2, sagittal T2 STIR, sagittal T2, sagittal T1, axial T1, axial T2 sequences\par \par  7.5 mL Gadavist administered intravenously.\par \par  COMPARISON: None available.\par \par  FINDINGS:\par \par \par \par  When counting inferiorly from craniocervical junction on total spine localizer sequences, there\par appear to be 7 cervical type, 12 thoracic-type and 5 lumbar type vertebral bodies. For purposes of\par this report, vertebral body at level of the iliolumbar ligament will be designated as L5.\par Inferiormost well-formed intervertebral disc space will be designated as L5-S1. No MR evidence for\par transitional lumbosacral anatomy.\par \par \par  Lumbar lordosis is maintained. No significant spondylolisthesis. Vertebral body heights are\par maintained. Vertebral body bone marrow signal within normal limits. No abnormal cord signal\par identified. Conus terminates at L1. No significant periarticular or paraspinal soft tissue edema is\par identified. There are multilevel degenerative endplate changes with osteophyte formation,\par intervertebral disc space narrowing/desiccation, Schmorl's nodes and endplate irregularity, most\par prominently at L1-L2 and to a lesser degree at L3-L4 where there are degenerative endplate edematous\par changes, Modic type I with associated vertebral body enhancement. No suspicious expansile or\par destructive osseous lesion identified. Paraspinal soft tissues are unremarkable.\par \par  There are multilevel findings as follows:\par \par  Disc bulge, bilateral facet arthropathy, ligamentous hypertrophy contributing to mild canal\par stenosis and mild bilateral foraminal stenosis.\par \par  T12-L1: No significant canal or foraminal stenosis.\par \par  L1-L2: No significant canal or foraminal stenosis.\par \par  L2-L3: No significant canal or foraminal stenosis.\par \par  L3-L4: Disc bulge, bilateral facet arthropathy, ligamentous hypertrophy contributing to no\par significant canal stenosis and mild bilateral foraminal stenosis.\par \par  L4-L5: Disc bulge, bilateral facet arthropathy, ligamentous hypertrophy contributing to no\par significant canal stenosis and moderate bilateral foraminal stenosis.\par \par  L5-S1: Disc bulge, bilateral facet arthropathy, ligamentous hypertrophy contributing to no\par significant canal stenosis and mild to moderate bilateral foraminal stenosis.\par \par \par \par  IMPRESSION:\par \par  Multilevel lumbar spondylitic changes as detailed above notable for mild to moderate bilateral\par foraminal stenosis at L3-L4, L4-L5 and L5-S1 involving exiting L3, L4, L5 nerve roots, respectively.\par No significant canal stenosis of the lumbar spine.\par \par  Multilevel degenerative endplate changes most prominently at L1-L2 and L3-L4, where are\par degenerative endplate edematous changes, Modic type I with associated vertebral body enhancement.\par \par \par  If there are questions/need for clarification regarding this report, Dr. Delonte Gong can be best\par reached via the patient [facility] system at [place] [email or web address]@[facility].\par \par  --- End of Report ---\par \par ***Electronically Signed ***\par -----------------------------------------------\par Delonte Whitmore MD              05/31/22 1822\par \par Dictated on 05/31/22\par \par \par Report cc:  Enriqueta Arrington MD;\par \par  \par \par  Ordered by: ENRIQUETA ARRINGTON       Collected/Examined: [id number] 11:00AM       \par Verified by: ENRIQUETA ARRINGTON 01Jun2022 12:24PM       \par  Result Communication: No patient communication needed at this time;\par Stage: Final       \par  Performed at: [facility]       Resulted: [id number] 06:22PM       Last Updated: 01Jun2022 12:24PM       Accession: [id number]

## 2022-06-20 NOTE — HISTORY OF PRESENT ILLNESS
[FreeTextEntry1] : Pain improved with gabapentin\par \par Walking one mile per day!\par Feels stronger\par \par 6/2/22\par Notes left facial pain since 1/21\par implant in 10/20 \par left v2 when its severe its hard to identify\par describes a throbbing pain\par doesn’t prevent chewing \par not sensitive to hot and cold\par no temporal pattern\par seen dentist, endodontist\par Has to take Advil and Tylenol 2-3 times a day\par \par Low back pain with radiation into left leg.\par Left greater than right. \par Pain improved worse when first she wakes up. \par Doing PT\par \par Flexeril 5 mg at night helps- avoid during day bc of fatigue.\par Motrin or Tylenol helps with the pain\par \par Reviewed MR Brain, c-spine, L- spine\par \par Last A1c is better\par \par 3/1/22\par This is a 71-year-old woman who is being seen in neurologic consultation for evaluation of problems with balance.  She has noticed this problems over the last few months.  She notes no significant neck pain.  She has no significant low back or hip pain.  She has no tremors or increased stiffness in her arms.  She does notice that her left leg seems to be dragging more.  She feels that there is more stiffness in the left leg.  She has bilateral numbness in the feet which seems to be worse on the left compared to the right.  There is no stabbing or shooting pain in her feet.  She had been taking care of her  who recently passed away on January 30.  She had not been as active as before.\par \par This past Saturday she actually fell down.  She fell sideways and backwards.  She describes feeling like she was pulled backwards.  She did not injure her head.  She fell on her buttock.  This is not the first time this has happened.  She has become very self-conscious while walking.  She describes her walk as someone who is drunk.\par \par On February 12 she had a MI for which she was taken to [facility] for cardiac catheterization.

## 2022-06-20 NOTE — ASSESSMENT
[FreeTextEntry1] : Continue Gabapentin\par \par PT finished- maintain exercises at home.\par \par EMG reviewed. No large fiber neuropathy- cannot rule out small fiber neuropathy\par Left median nerve entrapment affecting sensory fibers only.\par \par Encouraged exercise\par

## 2022-08-23 ENCOUNTER — RX RENEWAL (OUTPATIENT)
Age: 72
End: 2022-08-23

## 2022-08-23 NOTE — END OF VISIT
[Time Spent: ___ minutes] : I have spent [unfilled] minutes of time on the encounter.
[Time Spent: ___ minutes] : I have spent [unfilled] minutes of time on the encounter.
PRINCIPAL DISCHARGE DIAGNOSIS  Diagnosis: Urinary tract infection due to ESBL Klebsiella  Assessment and Plan of Treatment: completed 7 days of IV antibiotics. discharge with almodovar cath and follow up with out patient urology for further work up.

## 2022-10-13 ENCOUNTER — TRANSCRIPTION ENCOUNTER (OUTPATIENT)
Age: 72
End: 2022-10-13

## 2022-11-21 ENCOUNTER — APPOINTMENT (OUTPATIENT)
Dept: HEART AND VASCULAR | Facility: CLINIC | Age: 72
End: 2022-11-21

## 2022-12-19 ENCOUNTER — APPOINTMENT (OUTPATIENT)
Dept: NEUROLOGY | Facility: CLINIC | Age: 72
End: 2022-12-19

## 2022-12-19 VITALS
HEART RATE: 64 BPM | HEIGHT: 65 IN | OXYGEN SATURATION: 98 % | SYSTOLIC BLOOD PRESSURE: 109 MMHG | DIASTOLIC BLOOD PRESSURE: 72 MMHG | WEIGHT: 173 LBS | BODY MASS INDEX: 28.82 KG/M2

## 2022-12-19 PROCEDURE — 99215 OFFICE O/P EST HI 40 MIN: CPT

## 2022-12-20 ENCOUNTER — TRANSCRIPTION ENCOUNTER (OUTPATIENT)
Age: 72
End: 2022-12-20

## 2022-12-20 NOTE — PHYSICAL EXAM
[FreeTextEntry1] : Physical examination \par General: No acute distress, Awake, Alert. \par \par Mental status \par Awake, alert, and oriented to person, time and place, Normal attention span and concentration, Recent and remote memory intact, Language intact, Fund of knowledge intact. \par Cranial Nerves \par II: VFF \par III, IV, VI: PERRL, EOMI. \par V: Facial sensation is normal B/L. \par VII: Facial strength is normal B/L. \par VIII: Gross hearing is intact. \par IX, X: Palate is midline and elevates symmetrically. \par XI: Trapezius normal strength. \par XII: Tongue midline without atrophy or fasciculations. \par \par Motor exam \par BUE normal strength\par BLE - Proximal 5/5\par distal strength is normal\par \par Reflexes \par diffusely brisk\par Plantars right: mute. \par Plantars left: mute. \par \par Coordination \par Finger to nose: Normal. \par Heel to shin: some difficulty bilaterally\par \par Sensory \par Impaired vibration; reduced pp; Romberg positive\par \par Gait \par Wide-based gait and very cautious\par bilateral reduced arm swing\par no tremor\par no cogwheeling\par reduced blink rate\par

## 2022-12-20 NOTE — DATA REVIEWED
[de-identified] : \par  [facility] MRI Report             Final\par \par No Documents Attached\par \par \par \par \par   [facility]\par                                          [address]\par                                    [place], [place]  [address]\par                                        Department of Radiology\par                                             [phone]\par \par \par Patient Name:      VA HALEY                Location:       [facility]\par Med Rec #:        [id number]                    Account #:      [id number]\par YOB: 1950                    Ordering:       Enriqueta Arrington MD\par Age: 72               Sex:    F                 Attending:      Enriqueta Arrington MD\par PCP:        Heri Andrade MD\par ______________________________________________________________________________________\par \par Exam Date:      05/28/22\par Exam:         MRI LUMBAR SPINE WAW \par Order#:       MRI [id number]\par \par \par \par EXAM: MRI lumbar spine without contrast\par \par  INDICATION: Lumbar back pain\par \par  TECHNIQUE: MR exam of lumbar spine with and without contrast utilizing sagittal/axial T1\par postcontrast coronal T2, sagittal T2 STIR, sagittal T2, sagittal T1, axial T1, axial T2 sequences\par \par  7.5 mL Gadavist administered intravenously.\par \par  COMPARISON: None available.\par \par  FINDINGS:\par \par \par \par  When counting inferiorly from craniocervical junction on total spine localizer sequences, there\par appear to be 7 cervical type, 12 thoracic-type and 5 lumbar type vertebral bodies. For purposes of\par this report, vertebral body at level of the iliolumbar ligament will be designated as L5.\par Inferiormost well-formed intervertebral disc space will be designated as L5-S1. No MR evidence for\par transitional lumbosacral anatomy.\par \par \par  Lumbar lordosis is maintained. No significant spondylolisthesis. Vertebral body heights are\par maintained. Vertebral body bone marrow signal within normal limits. No abnormal cord signal\par identified. Conus terminates at L1. No significant periarticular or paraspinal soft tissue edema is\par identified. There are multilevel degenerative endplate changes with osteophyte formation,\par intervertebral disc space narrowing/desiccation, Schmorl's nodes and endplate irregularity, most\par prominently at L1-L2 and to a lesser degree at L3-L4 where there are degenerative endplate edematous\par changes, Modic type I with associated vertebral body enhancement. No suspicious expansile or\par destructive osseous lesion identified. Paraspinal soft tissues are unremarkable.\par \par  There are multilevel findings as follows:\par \par  Disc bulge, bilateral facet arthropathy, ligamentous hypertrophy contributing to mild canal\par stenosis and mild bilateral foraminal stenosis.\par \par  T12-L1: No significant canal or foraminal stenosis.\par \par  L1-L2: No significant canal or foraminal stenosis.\par \par  L2-L3: No significant canal or foraminal stenosis.\par \par  L3-L4: Disc bulge, bilateral facet arthropathy, ligamentous hypertrophy contributing to no\par significant canal stenosis and mild bilateral foraminal stenosis.\par \par  L4-L5: Disc bulge, bilateral facet arthropathy, ligamentous hypertrophy contributing to no\par significant canal stenosis and moderate bilateral foraminal stenosis.\par \par  L5-S1: Disc bulge, bilateral facet arthropathy, ligamentous hypertrophy contributing to no\par significant canal stenosis and mild to moderate bilateral foraminal stenosis.\par \par \par \par  IMPRESSION:\par \par  Multilevel lumbar spondylitic changes as detailed above notable for mild to moderate bilateral\par foraminal stenosis at L3-L4, L4-L5 and L5-S1 involving exiting L3, L4, L5 nerve roots, respectively.\par No significant canal stenosis of the lumbar spine.\par \par  Multilevel degenerative endplate changes most prominently at L1-L2 and L3-L4, where are\par degenerative endplate edematous changes, Modic type I with associated vertebral body enhancement.\par \par \par  If there are questions/need for clarification regarding this report, Dr. Delonte Gong can be best\par reached via the patient [facility] system at [place] [email or web address]@[email or web address].\par \par  --- End of Report ---\par \par ***Electronically Signed ***\par -----------------------------------------------\par Delonte Whitmore MD              05/31/22 1822\par \par Dictated on 05/31/22\par \par \par Report cc:  Enriqueta Arrington MD;\par \par  \par \par  Ordered by: ENRIQUETA ARRINGTON       Collected/Examined: [id number] 11:00AM       \par Verified by: ENRIQUETA ARRINGTON 01Jun2022 12:24PM       \par  Result Communication: No patient communication needed at this time;\par Stage: Final       \par  Performed at: [facility]       Resulted: [id number] 06:22PM       Last Updated: 01Jun2022 12:24PM       Accession: [id number]

## 2022-12-20 NOTE — ASSESSMENT
[FreeTextEntry1] : Continue Gabapentin\par 300 in am and 900 at night\par AM could be held if pain in control\par \par Tramadol prn - as a rescue for break-through pain\par \par PT finished- maintain exercises at home.\par \par She is depressed due to death of her  and sister this past year- encouraged counseling -gave number to [place] [facility]. \par \par Monitor abnormal exam (dec arm swing) - \par \par \par

## 2022-12-20 NOTE — CONSULT LETTER
[Dear  ___] : Dear  [unfilled], [Please see my note below.] : Please see my note below. [Courtesy Letter:] : I had the pleasure of seeing your patient, [unfilled], in my office today. [FreeTextEntry3] : Sincerely,\par \par Arnie Arrington M.D.\par

## 2022-12-20 NOTE — HISTORY OF PRESENT ILLNESS
[FreeTextEntry1] : Taking 800 mg Gabapentin\par Takes in the evening - \par \par 600 worked for a while, 700 did not help\par does not make her sleepy \par \par Has break through facial pain\par lasts a couple of hours and then goes away -\par sometimes has to take Tylenol or ibuprofen which helps a little\par \par LBP is stable\par sitting to standing\par worse part is in am and when she first wakes up \par heating pad and an hour later better \par rarely numbness in toes with back pain \par \par left leg feels stiff \par no tremor \par not swinging arms \par \par 6/2/22\par Pain improved with gabapentin\par \par Walking one mile per day!\par Feels stronger\par \par 6/2/22\par Notes left facial pain since 1/21\par implant in 10/20 \par left v2 when its severe its hard to identify\par describes a throbbing pain\par doesn’t prevent chewing \par not sensitive to hot and cold\par no temporal pattern\par seen dentist, endodontist\par Has to take Advil and Tylenol 2-3 times a day\par \par Low back pain with radiation into left leg.\par Left greater than right. \par Pain improved worse when first she wakes up. \par Doing PT\par \par Flexeril 5 mg at night helps- avoid during day bc of fatigue.\par Motrin or Tylenol helps with the pain\par \par Reviewed MR Brain, c-spine, L- spine\par \par Last A1c is better\par \par 3/1/22\par This is a 71-year-old woman who is being seen in neurologic consultation for evaluation of problems with balance.  She has noticed this problems over the last few months.  She notes no significant neck pain.  She has no significant low back or hip pain.  She has no tremors or increased stiffness in her arms.  She does notice that her left leg seems to be dragging more.  She feels that there is more stiffness in the left leg.  She has bilateral numbness in the feet which seems to be worse on the left compared to the right.  There is no stabbing or shooting pain in her feet.  She had been taking care of her  who recently passed away on January 30.  She had not been as active as before.\par \par This past Saturday she actually fell down.  She fell sideways and backwards.  She describes feeling like she was pulled backwards.  She did not injure her head.  She fell on her buttock.  This is not the first time this has happened.  She has become very self-conscious while walking.  She describes her walk as someone who is drunk.\par \par On February 12 she had a MI for which she was taken to [facility] for cardiac catheterization.

## 2023-01-24 ENCOUNTER — RX RENEWAL (OUTPATIENT)
Age: 73
End: 2023-01-24

## 2023-02-02 ENCOUNTER — NON-APPOINTMENT (OUTPATIENT)
Age: 73
End: 2023-02-02

## 2023-02-02 ENCOUNTER — APPOINTMENT (OUTPATIENT)
Dept: HEART AND VASCULAR | Facility: CLINIC | Age: 73
End: 2023-02-02
Payer: MEDICARE

## 2023-02-02 VITALS
DIASTOLIC BLOOD PRESSURE: 66 MMHG | BODY MASS INDEX: 28.99 KG/M2 | SYSTOLIC BLOOD PRESSURE: 114 MMHG | HEART RATE: 65 BPM | TEMPERATURE: 97.6 F | RESPIRATION RATE: 16 BRPM | OXYGEN SATURATION: 99 % | HEIGHT: 65 IN | WEIGHT: 174 LBS

## 2023-02-02 PROCEDURE — 99215 OFFICE O/P EST HI 40 MIN: CPT

## 2023-02-02 PROCEDURE — 93000 ELECTROCARDIOGRAM COMPLETE: CPT

## 2023-02-02 RX ORDER — L.ACID,FERM,PLA,RHA/B.BIF,LONG 126 MG
TABLET, DELAYED AND EXTENDED RELEASE ORAL
Refills: 0 | Status: ACTIVE | COMMUNITY

## 2023-02-02 RX ORDER — DOCUSATE CALCIUM 240 MG
CAPSULE ORAL
Refills: 0 | Status: ACTIVE | COMMUNITY

## 2023-02-02 RX ORDER — TRAMADOL HYDROCHLORIDE 50 MG/1
50 TABLET, COATED ORAL TWICE DAILY
Qty: 30 | Refills: 1 | Status: DISCONTINUED | COMMUNITY
Start: 2022-12-19 | End: 2023-02-02

## 2023-02-02 RX ORDER — MULTIVITAMIN
TABLET ORAL
Refills: 0 | Status: ACTIVE | COMMUNITY

## 2023-02-02 RX ORDER — MULTIVIT-MIN/IRON/FOLIC ACID/K 18-600-40
CAPSULE ORAL
Refills: 0 | Status: ACTIVE | COMMUNITY

## 2023-02-02 RX ORDER — ELECTROLYTES/DEXTROSE
10 SOLUTION, ORAL ORAL
Refills: 0 | Status: ACTIVE | COMMUNITY

## 2023-02-02 RX ORDER — GABAPENTIN 100 MG/1
100 CAPSULE ORAL
Qty: 180 | Refills: 2 | Status: DISCONTINUED | COMMUNITY
Start: 2022-06-02 | End: 2023-02-02

## 2023-02-02 RX ORDER — PANTOPRAZOLE 40 MG/1
40 TABLET, DELAYED RELEASE ORAL
Refills: 0 | Status: DISCONTINUED | COMMUNITY
End: 2023-02-02

## 2023-02-05 ENCOUNTER — NON-APPOINTMENT (OUTPATIENT)
Age: 73
End: 2023-02-05

## 2023-02-05 NOTE — DISCUSSION/SUMMARY
[Bundle Branch Block] : ~T bundle branch block [Paroxysmal Atrial Fibrillation] : paroxysmal atrial fibrillation [Coronary Artery Disease] : coronary artery disease [Weight Reduction] : weight reduction [Hyperlipidemia] : hyperlipidemia [Diet Modification] : diet modification [Exercise] : exercise [Hypertension] : hypertension [Exercise Regimen] : an exercise regimen [Dietary Modification] : dietary modification [Weight Loss] : weight loss [Low Sodium Diet] : low sodium diet [Peripheral Vascular Disease] : peripheral vascular disease [Stable] : stable [None] : There are no changes in medication management [Exercise Rehab] : exercise rehabilitation [Patient] : the patient [de-identified] : nonobstructive disease by cardiac cath 2/2022; NSTEMI 2/2022 [de-identified] : remote last episode; DOAC not used due to bleeding risk in setting of colitis hx [de-identified] : min carotid plaque b/l [EKG obtained to assist in diagnosis and management of assessed problem(s)] : EKG obtained to assist in diagnosis and management of assessed problem(s)

## 2023-02-05 NOTE — PHYSICAL EXAM
[Well Developed] : well developed [Well Nourished] : well nourished [No Acute Distress] : no acute distress [Normal Venous Pressure] : normal venous pressure [Normal Conjunctiva] : normal conjunctiva [No Carotid Bruit] : no carotid bruit [Normal S1, S2] : normal S1, S2 [No Murmur] : no murmur [No Rub] : no rub [No Gallop] : no gallop [Clear Lung Fields] : clear lung fields [Good Air Entry] : good air entry [No Respiratory Distress] : no respiratory distress  [Soft] : abdomen soft [Non Tender] : non-tender [No Masses/organomegaly] : no masses/organomegaly [Normal Bowel Sounds] : normal bowel sounds [Normal Gait] : normal gait [No Edema] : no edema [No Cyanosis] : no cyanosis [No Varicosities] : no varicosities [No Clubbing] : no clubbing [No Rash] : no rash [No Skin Lesions] : no skin lesions [Moves all extremities] : moves all extremities [No Focal Deficits] : no focal deficits [Normal Speech] : normal speech [Normal memory] : normal memory [Alert and Oriented] : alert and oriented

## 2023-03-20 ENCOUNTER — APPOINTMENT (OUTPATIENT)
Dept: NEUROLOGY | Facility: CLINIC | Age: 73
End: 2023-03-20
Payer: MEDICARE

## 2023-03-20 VITALS
SYSTOLIC BLOOD PRESSURE: 115 MMHG | OXYGEN SATURATION: 100 % | HEART RATE: 62 BPM | WEIGHT: 174 LBS | HEIGHT: 65 IN | BODY MASS INDEX: 28.99 KG/M2 | DIASTOLIC BLOOD PRESSURE: 66 MMHG

## 2023-03-20 DIAGNOSIS — F32.A DEPRESSION, UNSPECIFIED: ICD-10-CM

## 2023-03-20 PROCEDURE — 99214 OFFICE O/P EST MOD 30 MIN: CPT

## 2023-03-22 NOTE — CONSULT LETTER
[Dear  ___] : Dear  [unfilled], [Courtesy Letter:] : I had the pleasure of seeing your patient, [unfilled], in my office today. [Please see my note below.] : Please see my note below. [FreeTextEntry3] : Sincerely,\par \par Arnie Arrington M.D.\par

## 2023-03-22 NOTE — ASSESSMENT
[FreeTextEntry1] : Continue Gabapentin\par 300 in am and 900 at night\par AM could be held if pain in control\par \par Tramadol prn - as a rescue for break-through pain\par \par PT finished- maintain exercises at home.\par \par She is depressed due to death of her  and sister this past year- encouraged counseling again as she has not started. \par \par \par \par \par

## 2023-03-22 NOTE — HISTORY OF PRESENT ILLNESS
[FreeTextEntry1] : Facial pain under good control\par \par Lbp continues \par sitting to standing\par worse part is in am and when she first wakes up \par heating pad and an hour later better \par rarely numbness in toes with back pain \par \par showed pics of raynauds\par can happen in cold and right before showering when she feels cold \par \par Taking Gabapentin 900 mg at bedtime - \par if needed can take one in am - which so far has been not needed.\par \par avoids walking when cold\par \par leg stiffs \par \par doing home exercise\par \par Depression - stable, not better.\par \par 12/19/22\par Taking 800 mg Gabapentin\par Takes in the evening - \par \par 600 worked for a while, 700 did not help\par does not make her sleepy \par \par Has break through facial pain\par lasts a couple of hours and then goes away -\par sometimes has to take Tylenol or ibuprofen which helps a little\par \par LBP is stable\par sitting to standing\par worse part is in am and when she first wakes up \par heating pad and an hour later better \par rarely numbness in toes with back pain \par \par left leg feels stiff \par no tremor \par not swinging arms \par \par 6/2/22\par Pain improved with gabapentin\par \par Walking one mile per day!\par Feels stronger\par \par 6/2/22\par Notes left facial pain since 1/21\par implant in 10/20 \par left v2 when its severe its hard to identify\par describes a throbbing pain\par doesn’t prevent chewing \par not sensitive to hot and cold\par no temporal pattern\par seen dentist, endodontist\par Has to take Advil and Tylenol 2-3 times a day\par \par Low back pain with radiation into left leg.\par Left greater than right. \par Pain improved worse when first she wakes up. \par Doing PT\par \par Flexeril 5 mg at night helps- avoid during day bc of fatigue.\par Motrin or Tylenol helps with the pain\par \par Reviewed MR Brain, c-spine, L- spine\par \par Last A1c is better\par \par 3/1/22\par This is a 71-year-old woman who is being seen in neurologic consultation for evaluation of problems with balance.  She has noticed this problems over the last few months.  She notes no significant neck pain.  She has no significant low back or hip pain.  She has no tremors or increased stiffness in her arms.  She does notice that her left leg seems to be dragging more.  She feels that there is more stiffness in the left leg.  She has bilateral numbness in the feet which seems to be worse on the left compared to the right.  There is no stabbing or shooting pain in her feet.  She had been taking care of her  who recently passed away on January 30.  She had not been as active as before.\par \par This past Saturday she actually fell down.  She fell sideways and backwards.  She describes feeling like she was pulled backwards.  She did not injure her head.  She fell on her buttock.  This is not the first time this has happened.  She has become very self-conscious while walking.  She describes her walk as someone who is drunk.\par \par On February 12 she had a MI for which she was taken to [facility] for cardiac catheterization.

## 2023-03-23 ENCOUNTER — TRANSCRIPTION ENCOUNTER (OUTPATIENT)
Age: 73
End: 2023-03-23

## 2023-05-30 ENCOUNTER — TRANSCRIPTION ENCOUNTER (OUTPATIENT)
Age: 73
End: 2023-05-30

## 2023-05-30 DIAGNOSIS — M54.50 LOW BACK PAIN, UNSPECIFIED: ICD-10-CM

## 2023-05-31 ENCOUNTER — TRANSCRIPTION ENCOUNTER (OUTPATIENT)
Age: 73
End: 2023-05-31

## 2023-05-31 ENCOUNTER — NON-APPOINTMENT (OUTPATIENT)
Age: 73
End: 2023-05-31

## 2023-06-01 ENCOUNTER — APPOINTMENT (OUTPATIENT)
Dept: NEUROLOGY | Facility: CLINIC | Age: 73
End: 2023-06-01
Payer: MEDICARE

## 2023-06-01 VITALS
OXYGEN SATURATION: 98 % | HEIGHT: 65 IN | SYSTOLIC BLOOD PRESSURE: 120 MMHG | DIASTOLIC BLOOD PRESSURE: 74 MMHG | WEIGHT: 174 LBS | HEART RATE: 64 BPM | BODY MASS INDEX: 28.99 KG/M2

## 2023-06-01 DIAGNOSIS — R41.9 UNSPECIFIED SYMPTOMS AND SIGNS INVOLVING COGNITIVE FUNCTIONS AND AWARENESS: ICD-10-CM

## 2023-06-01 PROCEDURE — 99214 OFFICE O/P EST MOD 30 MIN: CPT

## 2023-06-05 ENCOUNTER — APPOINTMENT (OUTPATIENT)
Dept: HEART AND VASCULAR | Facility: CLINIC | Age: 73
End: 2023-06-05
Payer: MEDICARE

## 2023-06-05 VITALS
SYSTOLIC BLOOD PRESSURE: 112 MMHG | HEART RATE: 55 BPM | WEIGHT: 173 LBS | HEIGHT: 65 IN | TEMPERATURE: 97.8 F | BODY MASS INDEX: 28.82 KG/M2 | DIASTOLIC BLOOD PRESSURE: 64 MMHG | OXYGEN SATURATION: 98 % | RESPIRATION RATE: 15 BRPM

## 2023-06-05 PROCEDURE — 99215 OFFICE O/P EST HI 40 MIN: CPT

## 2023-06-05 RX ORDER — LANSOPRAZOLE 30 MG/1
30 CAPSULE, DELAYED RELEASE ORAL TWICE DAILY
Refills: 0 | Status: ACTIVE | COMMUNITY

## 2023-06-05 NOTE — HISTORY OF PRESENT ILLNESS
[FreeTextEntry1] : leaning over coffee table to put batteries in remote\par turned and went head first into the kitchen floor which is ceramic\par face down on kitchen floor\par recalls being dizzy before (prolonged position bending to put in batteries) and then after that she was even dizzy\par \par does note a ha today (last this am)\par mild neck pain \par \par Even prior to this \par she noted bilateral leg heaviness (was supposed to start PT AND PAIN md)\par \par Does not feel as clear in the head\par will put liquid in mouth and does not go anywhere but then can swallow (offered as an example, rare)\par \par grieving . \par \par 3/20/23\par Facial pain under good control\par \par Lbp continues \par sitting to standing\par worse part is in am and when she first wakes up \par heating pad and an hour later better \par rarely numbness in toes with back pain \par \par showed pics of raynauds\par can happen in cold and right before showering when she feels cold \par \par Taking Gabapentin 900 mg at bedtime - \par if needed can take one in am - which so far has been not needed.\par \par avoids walking when cold\par \par leg stiffs \par \par doing home exercise\par \par Depression - stable, not better.\par \par 12/19/22\par Taking 800 mg Gabapentin\par Takes in the evening - \par \par 600 worked for a while, 700 did not help\par does not make her sleepy \par \par Has break through facial pain\par lasts a couple of hours and then goes away -\par sometimes has to take Tylenol or ibuprofen which helps a little\par \par LBP is stable\par sitting to standing\par worse part is in am and when she first wakes up \par heating pad and an hour later better \par rarely numbness in toes with back pain \par \par left leg feels stiff \par no tremor \par not swinging arms \par \par 6/2/22\par Pain improved with gabapentin\par \par Walking one mile per day!\par Feels stronger\par \par 6/2/22\par Notes left facial pain since 1/21\par implant in 10/20 \par left v2 when its severe its hard to identify\par describes a throbbing pain\par doesn’t prevent chewing \par not sensitive to hot and cold\par no temporal pattern\par seen dentist, endodontist\par Has to take Advil and Tylenol 2-3 times a day\par \par Low back pain with radiation into left leg.\par Left greater than right. \par Pain improved worse when first she wakes up. \par Doing PT\par \par Flexeril 5 mg at night helps- avoid during day bc of fatigue.\par Motrin or Tylenol helps with the pain\par \par Reviewed MR Brain, c-spine, L- spine\par \par Last A1c is better\par \par 3/1/22\par This is a 71-year-old woman who is being seen in neurologic consultation for evaluation of problems with balance.  She has noticed this problems over the last few months.  She notes no significant neck pain.  She has no significant low back or hip pain.  She has no tremors or increased stiffness in her arms.  She does notice that her left leg seems to be dragging more.  She feels that there is more stiffness in the left leg.  She has bilateral numbness in the feet which seems to be worse on the left compared to the right.  There is no stabbing or shooting pain in her feet.  She had been taking care of her  who recently passed away on January 30.  She had not been as active as before.\par \par This past Saturday she actually fell down.  She fell sideways and backwards.  She describes feeling like she was pulled backwards.  She did not injure her head.  She fell on her buttock.  This is not the first time this has happened.  She has become very self-conscious while walking.  She describes her walk as someone who is drunk.\par \par On February 12 she had a MI for which she was taken to [facility] for cardiac catheterization.

## 2023-06-05 NOTE — ASSESSMENT
[FreeTextEntry1] : Continue Gabapentin\par 300 in am and 900 at night\par AM could be held if pain in control\par \par Tramadol prn - as a rescue for break-through pain\par \par She needs to see neurosurgery and will see Dr. Heri Luna.\par Will begin PT.\par \par She recently started fluoxetine 10 mg daily. \par \par encouraged her to seek counseling. \par \par I think a major portion of her cognitive complaints are due to depression- if no relief with counseling and medication, will get neuropsychological evaluation. \par \par \par \par \par

## 2023-06-05 NOTE — DATA REVIEWED
[de-identified] : \par  MRI Report             Final\par \par No Documents Attached\par \par \par \par \par   [facility]\par                                          [address]\par                                    [place], [place]  [address]\par                                        Department of Radiology\par                                             [phone]\par \par \par Patient Name:      VA HALEY                Location:       [facility]\par Med Rec #:        [id number]                    Account #:      [id number]\par YOB: 1950                    Ordering:       Enriqueta Arrington MD\par Age: 72               Sex:    F                 Attending:      Enriqueta Arrington MD\par PCP:        Heri Andrade MD\par ______________________________________________________________________________________\par \par Exam Date:      05/28/22\par Exam:         MRI LUMBAR SPINE WAW \par Order#:       MRI [id number]\par \par \par \par EXAM: MRI lumbar spine without contrast\par \par  INDICATION: Lumbar back pain\par \par  TECHNIQUE: MR exam of lumbar spine with and without contrast utilizing sagittal/axial T1\par postcontrast coronal T2, sagittal T2 STIR, sagittal T2, sagittal T1, axial T1, axial T2 sequences\par \par  7.5 mL Gadavist administered intravenously.\par \par  COMPARISON: None available.\par \par  FINDINGS:\par \par \par \par  When counting inferiorly from craniocervical junction on total spine localizer sequences, there\par appear to be 7 cervical type, 12 thoracic-type and 5 lumbar type vertebral bodies. For purposes of\par this report, vertebral body at level of the iliolumbar ligament will be designated as L5.\par Inferiormost well-formed intervertebral disc space will be designated as L5-S1. No MR evidence for\par transitional lumbosacral anatomy.\par \par \par  Lumbar lordosis is maintained. No significant spondylolisthesis. Vertebral body heights are\par maintained. Vertebral body bone marrow signal within normal limits. No abnormal cord signal\par identified. Conus terminates at L1. No significant periarticular or paraspinal soft tissue edema is\par identified. There are multilevel degenerative endplate changes with osteophyte formation,\par intervertebral disc space narrowing/desiccation, Schmorl's nodes and endplate irregularity, most\par prominently at L1-L2 and to a lesser degree at L3-L4 where there are degenerative endplate edematous\par changes, Modic type I with associated vertebral body enhancement. No suspicious expansile or\par destructive osseous lesion identified. Paraspinal soft tissues are unremarkable.\par \par  There are multilevel findings as follows:\par \par  Disc bulge, bilateral facet arthropathy, ligamentous hypertrophy contributing to mild canal\par stenosis and mild bilateral foraminal stenosis.\par \par  T12-L1: No significant canal or foraminal stenosis.\par \par  L1-L2: No significant canal or foraminal stenosis.\par \par  L2-L3: No significant canal or foraminal stenosis.\par \par  L3-L4: Disc bulge, bilateral facet arthropathy, ligamentous hypertrophy contributing to no\par significant canal stenosis and mild bilateral foraminal stenosis.\par \par  L4-L5: Disc bulge, bilateral facet arthropathy, ligamentous hypertrophy contributing to no\par significant canal stenosis and moderate bilateral foraminal stenosis.\par \par  L5-S1: Disc bulge, bilateral facet arthropathy, ligamentous hypertrophy contributing to no\par significant canal stenosis and mild to moderate bilateral foraminal stenosis.\par \par \par \par  IMPRESSION:\par \par  Multilevel lumbar spondylitic changes as detailed above notable for mild to moderate bilateral\par foraminal stenosis at L3-L4, L4-L5 and L5-S1 involving exiting L3, L4, L5 nerve roots, respectively.\par No significant canal stenosis of the lumbar spine.\par \par  Multilevel degenerative endplate changes most prominently at L1-L2 and L3-L4, where are\par degenerative endplate edematous changes, Modic type I with associated vertebral body enhancement.\par \par \par  If there are questions/need for clarification regarding this report, Dr. Delonte Gong can be best\par reached via the [facility] [email or web address].\par \par  --- End of Report ---\par \par ***Electronically Signed ***\par -----------------------------------------------\par Delonte Whitmore MD              05/31/22 1822\par \par Dictated on 05/31/22\par \par \par Report cc:  Enriqueta Arrington MD;\par \par  \par \par  Ordered by: ENRIQUETA ARIRNGTON       Collected/Examined: [id number] 11:00AM       \par Verified by: ERNIQUETA ARRINGTON 01Jun2022 12:24PM       \par  Result Communication: No patient communication needed at this time;\par Stage: Final       \par  Performed at: [facility]       Resulted: [id number] 06:22PM       Last Updated: 01Jun2022 12:24PM       Accession: [id number]        [de-identified] : \par  [place] MRI Report             Final\par \par No Documents Attached\par \par \par \par \par   [facility]\par                                          [address]\par                                    [place]  [address]\par                                        Department of Radiology\par                                             [phone]\par \par \par Patient Name:      VA HALEY                Location:       [facility]\par Med Rec #:        [id number]                    Account #:      [id number]\par YOB: 1950                    Ordering:       Enriqueta Arrington MD\par Age: 72               Sex:    F                 Attending:      Enriqueta Arrington MD\par PCP:        Heri Andrade MD\par ______________________________________________________________________________________\par \par Exam Date:      05/28/22\par Exam:         MRI LUMBAR SPINE WAW \par Order#:       MRI [id number]\par \par \par \par EXAM: MRI lumbar spine without contrast\par \par  INDICATION: Lumbar back pain\par \par  TECHNIQUE: MR exam of lumbar spine with and without contrast utilizing sagittal/axial T1\par postcontrast coronal T2, sagittal T2 STIR, sagittal T2, sagittal T1, axial T1, axial T2 sequences\par \par  7.5 mL Gadavist administered intravenously.\par \par  COMPARISON: None available.\par \par  FINDINGS:\par \par \par \par  When counting inferiorly from craniocervical junction on total spine localizer sequences, there\par appear to be 7 cervical type, 12 thoracic-type and 5 lumbar type vertebral bodies. For purposes of\par this report, vertebral body at level of the iliolumbar ligament will be designated as L5.\par Inferiormost well-formed intervertebral disc space will be designated as L5-S1. No MR evidence for\par transitional lumbosacral anatomy.\par \par \par  Lumbar lordosis is maintained. No significant spondylolisthesis. Vertebral body heights are\par maintained. Vertebral body bone marrow signal within normal limits. No abnormal cord signal\par identified. Conus terminates at L1. No significant periarticular or paraspinal soft tissue edema is\par identified. There are multilevel degenerative endplate changes with osteophyte formation,\par intervertebral disc space narrowing/desiccation, Schmorl's nodes and endplate irregularity, most\par prominently at L1-L2 and to a lesser degree at L3-L4 where there are degenerative endplate edematous\par changes, Modic type I with associated vertebral body enhancement. No suspicious expansile or\par destructive osseous lesion identified. Paraspinal soft tissues are unremarkable.\par \par  There are multilevel findings as follows:\par \par  Disc bulge, bilateral facet arthropathy, ligamentous hypertrophy contributing to mild canal\par stenosis and mild bilateral foraminal stenosis.\par \par  T12-L1: No significant canal or foraminal stenosis.\par \par  L1-L2: No significant canal or foraminal stenosis.\par \par  L2-L3: No significant canal or foraminal stenosis.\par \par  L3-L4: Disc bulge, bilateral facet arthropathy, ligamentous hypertrophy contributing to no\par significant canal stenosis and mild bilateral foraminal stenosis.\par \par  L4-L5: Disc bulge, bilateral facet arthropathy, ligamentous hypertrophy contributing to no\par significant canal stenosis and moderate bilateral foraminal stenosis.\par \par  L5-S1: Disc bulge, bilateral facet arthropathy, ligamentous hypertrophy contributing to no\par significant canal stenosis and mild to moderate bilateral foraminal stenosis.\par \par \par \par  IMPRESSION:\par \par  Multilevel lumbar spondylitic changes as detailed above notable for mild to moderate bilateral\par foraminal stenosis at L3-L4, L4-L5 and L5-S1 involving exiting L3, L4, L5 nerve roots, respectively.\par No significant canal stenosis of the lumbar spine.\par \par  Multilevel degenerative endplate changes most prominently at L1-L2 and L3-L4, where are\par degenerative endplate edematous changes, Modic type I with associated vertebral body enhancement.\par \par \par  If there are questions/need for clarification regarding this report, Dr. Delonte Gong can be best\par reached via the patient [facility] [employer] system at [email or web address].\par \par  --- End of Report ---\par \par ***Electronically Signed ***\par -----------------------------------------------\par Delonte Whitmore MD              05/31/22 1822\par \par Dictated on 05/31/22\par \par \par Report cc:  Enriqueta Arrington MD;\par \par  \par \par  Ordered by: ENRIQUETA ARRINGTON       Collected/Examined: [id number] 11:00AM       \par Verified by: ENRIQUETA ARRINGTON 01Jun2022 12:24PM       \par  Result Communication: No patient communication needed at this time;\par Stage: Final       \par  Performed at: [facility]       Resulted: [id number] 06:22PM       Last Updated: 01Jun2022 12:24PM       Accession: [id number]

## 2023-06-05 NOTE — PHYSICAL EXAM
[FreeTextEntry1] : Physical examination \par General: No acute distress, Awake, Alert. \par \par Right jake-orbital ecchymoses\par bruising on right chin \par \par Mental status \par Awake, alert, and oriented to person, time and place, Normal attention span and concentration, Recent and remote memory intact, Language intact, Fund of knowledge intact. \par Cranial Nerves \par II: VFF \par III, IV, VI: PERRL, EOMI. \par V: Facial sensation is normal B/L. \par VII: Facial strength is normal B/L. \par VIII: Gross hearing is intact. \par IX, X: Palate is midline and elevates symmetrically. \par XI: Trapezius normal strength. \par XII: Tongue midline without atrophy or fasciculations. \par \par Motor exam \par BUE normal strength\par BLE - Proximal 5/5\par distal strength is normal\par \par Reflexes \par diffusely brisk\par Plantars right: mute. \par Plantars left: mute. \par \par Coordination \par Finger to nose: Normal. \par Heel to shin: some difficulty bilaterally\par \par Sensory \par Impaired vibration; reduced pp; Romberg positive\par \par Gait \par Wide-based gait and very cautious\par bilateral reduced arm swing\par no tremor\par no cogwheeling\par reduced blink rate\par

## 2023-06-07 ENCOUNTER — NON-APPOINTMENT (OUTPATIENT)
Age: 73
End: 2023-06-07

## 2023-06-08 ENCOUNTER — RX RENEWAL (OUTPATIENT)
Age: 73
End: 2023-06-08

## 2023-06-13 NOTE — HISTORY OF PRESENT ILLNESS
[FreeTextEntry1] : Vicky Ovalles returns for follow up.  \par \par In late January and early February, she was evaluated for abdominal pain and treated for UTI.  Her antibiotic regimen for UTI was changed to address organism sensitivities.  She developed red and itchy palms, took Benadryl, and then developed midsternal chest discomfort with elevated BP.  She presented to [facility] where she found to have increasing troponin levels.  She was transferred to [facility] where she underwent cardiac cath.  Cath revealed nl LVEF (55%) with LVEDP 11mmHg, 30% LAD/RI, min CX disease, 40-50% RCA lesions.  Medical management was recommended for presumed Type II MI (ACE inh was started).\par \par She lost her balance and fell at home - suffering facial injuries.  [facility] eval - no fractures.  Neuro follow up ongoing.\par \par Today, she denies cp, sob, pnd, orthopnea, edema, palp, or loc.\par \par She is active.  She is compliant with meds. \par \par TTE 4/2022: nl lv sys fxn; nl dubose fxn; no evidence of  shunt \par \par Clinical hx reviewed in detail.\par \par Recommendations:\par 1. continue current meds (no DOAC at ths time - risk for bleeding setting of colitis hx); t/c SGLT-2 inh\par 2. Mediterranean diet\par 3. exercise\par 4. collect records from primary care, GI (Jolene), Dr Zaman etc\par 5. f/u in 12/2023\par

## 2023-06-13 NOTE — DISCUSSION/SUMMARY
[Bundle Branch Block] : ~T bundle branch block [Paroxysmal Atrial Fibrillation] : paroxysmal atrial fibrillation [Coronary Artery Disease] : coronary artery disease [Weight Reduction] : weight reduction [Hyperlipidemia] : hyperlipidemia [Diet Modification] : diet modification [Exercise] : exercise [Hypertension] : hypertension [Exercise Regimen] : an exercise regimen [Dietary Modification] : dietary modification [Weight Loss] : weight loss [Low Sodium Diet] : low sodium diet [Peripheral Vascular Disease] : peripheral vascular disease [Stable] : stable [None] : There are no changes in medication management [Exercise Rehab] : exercise rehabilitation [Patient] : the patient [de-identified] : remote last episode; DOAC not used due to bleeding risk in setting of colitis hx [de-identified] : nonobstructive disease by cardiac cath 2/2022; NSTEMI 2/2022 [de-identified] : min carotid plaque b/l

## 2023-06-13 NOTE — PHYSICAL EXAM
[Well Developed] : well developed [Well Nourished] : well nourished [No Acute Distress] : no acute distress [Normal Venous Pressure] : normal venous pressure [No Carotid Bruit] : no carotid bruit [Normal S1, S2] : normal S1, S2 [No Murmur] : no murmur [No Rub] : no rub [No Gallop] : no gallop [Clear Lung Fields] : clear lung fields [Good Air Entry] : good air entry [No Respiratory Distress] : no respiratory distress  [Soft] : abdomen soft [Non Tender] : non-tender [No Masses/organomegaly] : no masses/organomegaly [Normal Gait] : normal gait [Normal Bowel Sounds] : normal bowel sounds [No Edema] : no edema [No Cyanosis] : no cyanosis [No Clubbing] : no clubbing [No Varicosities] : no varicosities [No Rash] : no rash [No Skin Lesions] : no skin lesions [Moves all extremities] : moves all extremities [No Focal Deficits] : no focal deficits [Normal Speech] : normal speech [Alert and Oriented] : alert and oriented [Normal memory] : normal memory [de-identified] : facial/periorbital bruising

## 2023-06-15 ENCOUNTER — APPOINTMENT (OUTPATIENT)
Dept: PAIN MANAGEMENT | Facility: CLINIC | Age: 73
End: 2023-06-15
Payer: MEDICARE

## 2023-06-15 VITALS
SYSTOLIC BLOOD PRESSURE: 103 MMHG | DIASTOLIC BLOOD PRESSURE: 64 MMHG | BODY MASS INDEX: 28.82 KG/M2 | HEIGHT: 65 IN | WEIGHT: 173 LBS

## 2023-06-15 DIAGNOSIS — M48.061 SPINAL STENOSIS, LUMBAR REGION WITHOUT NEUROGENIC CLAUDICATION: ICD-10-CM

## 2023-06-15 DIAGNOSIS — G89.4 CHRONIC PAIN SYNDROME: ICD-10-CM

## 2023-06-15 DIAGNOSIS — M79.18 MYALGIA, OTHER SITE: ICD-10-CM

## 2023-06-15 PROCEDURE — 99204 OFFICE O/P NEW MOD 45 MIN: CPT

## 2023-06-15 NOTE — PHYSICAL EXAM
[de-identified] : Constitutional: Normal, well developed, no acute distress\par Eyes: Symmetric, External structures \par Oropharynx: Lips normal, symmetric, no external lesions appreciated\par Respiratory: Non-labored breathing, no audible wheezes\par Cardiac: Pulse palpated, no tachycardia\par Vascular: No cyanosis appreciated, no edema in bilateral lower extremities\par GI: Nondistended, no jaundice appreciated\par Neurovascular: CN2-12 grossly intact, Alert and oriented\par MSK: Normal muscle bulk, 5/5 Motor strength B/L in LE\par \par

## 2023-06-15 NOTE — ASSESSMENT
[FreeTextEntry1] : >> Imaging and Other Studies\par \par I personally reviewed the relevant imaging.  Discussed and explained to patient the likely source of pathology and pain.  Questions answered. MRI \par \par >> Therapy and Other Modalities\par \par consider PT\par \par >> Medications\par \par  continue gabapentin as per Dr. Arrington\par \par >> Interventions\par \par Significant component of axial back pain secondary to lumbar spondylosis and facet arthropathy demonstrated on MRI LS.  Pain refractory to conservative treatments.  Will schedule BILATERAL L4-sacral ala diagnostic medial branch block (2 joints, 3 nerves on each side) r/b/a discussed\par \par May consider radiofreqency ablation\par \par >> Consults\par \par na\par \par >> Discussion of Risks/Benefits/Alternatives\par \par 	>Regarding any scheduled procedures:\par \par I have discussed in detail with the patient that any interventional pain procedure is associated with potential risks.  The procedure may include an injection of steroids and potentially other medications (local anesthetic and normal saline) into the epidural space or surrounding tissue of the spine.  There are significant risks of this procedure which include and are not limited to infection, bleeding, worsening pain, dural puncture leading to postdural puncture headache, nerve damage, spinal cord injury, paralysis, stroke, and death.  \par \par There is a chance that the procedure does not improve their pain.  \par \par There are risks associated with the steroid being absorbed into the body systemically.  These include dysphoria, difficulty sleeping, mood swings and personality changes.  Premenopausal women may notice an irregularity in her menstrual cycle for 2-3 months following the injection.  Steroids can specifically affect patients with hypertension, diabetes, and peptic ulcers.  The procedure may cause a temporary increase in blood pressure and blood pressure, and may adversely affect a peptic ulcer.  Other, more rare complications, include avascular necrosis of joints, glaucoma and worsening of osteoporosis. \par \par I have discussed the risks of the procedure at length with the patient, and the potential benefits of pain relief.  I have offered alternatives to the procedure.  All questions were answered.  \par \par The patient expressed understanding and wishes to proceed with the procedure.\par \par 	>Regarding COVID19 Pandemic: \par \par Any planned interventional pain procedure are scheduled because further delay may cause harm or negative outcome to patient.  The goal in performing this procedure is to avoid deterioration of function, emergency room visits (which increases exposure) and reliance on opioids.  \par \par r/b/a discussed with patient, lack of evidence to conclusively determine whether pain management procedures have any positive or negative impact on the possibility of kelly the virus and/or development of any sequelae. \par \par Patient counselled regarding timing steroid based intervention 2 weeks before or after COVID-19 vaccine administration to avoid any interaction or affect on efficacy of vaccination\par \par Patient demonstrates understanding\par \par Informed patient that risks associated with the COVID-19 infection.  Informed patient steps taken to limit the risks.  We are implementing safety precautions and following protocols consistent with the CDC and state recommendations. All patients and staff will be checked for fever or signs of illness upon entry to the facility. We will limit our steroid dose to the lowest effective therapeutic dose or in some cases steroids will not be injected at all. \par \par Patient agrees to proceed\par \par >> Conclusion\par \par The above diagnosis and treatment plan is medically reasonable and necessary based on the patient encounter \par There were no barriers to communication.\par Informed patient that I would be available for any additional questions.\par Patient was instructed to call with any worsening symptoms including severe pain, new numbness/weakness, or changes in the bowel/bladder function. \par Discussed role of nsaids in pain management and all relevant risks, if patient is continuing to require after 4 weeks the patient should f/u for alternative treatment. \par Instructed patient to maintain pain diary to monitor pain level, mobility, and function.\par \par The referring provider was informed of the above diagnosis and treatment plan.\par

## 2023-06-15 NOTE — HISTORY OF PRESENT ILLNESS
[7] : 3. What number best describes how, during the past week, pain has interfered with your general activity? 7/10 pain [Back Pain] : back pain [___ yrs] : [unfilled] year(s) ago [Constant] : constant [2] : an average pain level of 2/10 [0] : a minimum pain level of 0/10 [8] : a maximum pain level of 8/10 [Sharp] : sharp [Aching] : aching [Electric] : electric [Sitting] : sitting [Standing] : standing [Bending] : bending [Medications] : medications [Heat] : heat [Other: ___] : [unfilled] [FreeTextEntry1] : HPI\par \par  \par \par Ms. VA HALEY is a 73 year F on baby ASA with pmhx of Type II MI (Dr Covarrubias), Hx Mckayla-Parkinson-White (WPW syndrome -ablation 93), hx Atrial fibrillation, balance problems with multiple falls- last fall memorial day wknd (Dr Morocho& Dr Farfan- ENT), DM2, HTN, right breast lobular ca (tamoxifen x 5 yrs), L1-L2 lami (2007- Heri Luna). C/o lower back pain. Worst with transitions, on rising, and with prolonged activity. Notes pain radiated to right lateral thigh at times and has stiffness to left leg. Gait unsteady. Pain has impacted her ability to perform ADL's. Pending first PT session today.Denies any additional weakness, numbness, bowel/bladder dysfunction.\par \par \par \par Previous and current pain medications/doses/effects:  Ibuprofen, Flexeril 5mg, Gabapentin 300mg qhs, 900mg qhs\par \par  \par \par Previous Pain Treatments: None\par  \par \par Previous Pain Injections: None\par \par  \par \par Previous Diagnostic Studies/Images:\par 5/31/22  MRI LUMBAR SPINE [facility] IC \par Order#: [id number] \par \par \par \par EXAM: MRI lumbar spine without contrast \par \par  INDICATION: Lumbar back pain \par \par  TECHNIQUE: MR exam of lumbar spine with and without contrast utilizing sagittal/axial T1 \par postcontrast coronal T2, sagittal T2 STIR, sagittal T2, sagittal T1, axial T1, axial T2 sequences \par \par  7.5 mL Gadavist administered intravenously. \par \par  COMPARISON: None available. \par \par  FINDINGS: \par \par \par \par  When counting inferiorly from craniocervical junction on total spine localizer sequences, there \par appear to be 7 cervical type, 12 thoracic-type and 5 lumbar type vertebral bodies. For purposes of \par this report, vertebral body at level of the iliolumbar ligament will be designated as L5. \par Inferiormost well-formed intervertebral disc space will be designated as L5-S1. No MR evidence for \par transitional lumbosacral anatomy. \par \par \par  Lumbar lordosis is maintained. No significant spondylolisthesis. Vertebral body heights are \par maintained. Vertebral body bone marrow signal within normal limits. No abnormal cord signal \par identified. Conus terminates at L1. No significant periarticular or paraspinal soft tissue edema is \par identified. There are multilevel degenerative endplate changes with osteophyte formation, \par intervertebral disc space narrowing/desiccation, Schmorl's nodes and endplate irregularity, most \par prominently at L1-L2 and to a lesser degree at L3-L4 where there are degenerative endplate edematous\par changes, Modic type I with associated vertebral body enhancement. No suspicious expansile or \par destructive osseous lesion identified. Paraspinal soft tissues are unremarkable. \par \par  There are multilevel findings as follows: \par \par  Disc bulge, bilateral facet arthropathy, ligamentous hypertrophy contributing to mild canal \par stenosis and mild bilateral foraminal stenosis. \par \par  T12-L1: No significant canal or foraminal stenosis. \par \par  L1-L2: No significant canal or foraminal stenosis. \par \par  L2-L3: No significant canal or foraminal stenosis. \par \par  L3-L4: Disc bulge, bilateral facet arthropathy, ligamentous hypertrophy contributing to no \par significant canal stenosis and mild bilateral foraminal stenosis. \par \par  L4-L5: Disc bulge, bilateral facet arthropathy, ligamentous hypertrophy contributing to no \par significant canal stenosis and moderate bilateral foraminal stenosis. \par \par  L5-S1: Disc bulge, bilateral facet arthropathy, ligamentous hypertrophy contributing to no \par significant canal stenosis and mild to moderate bilateral foraminal stenosis. \par \par \par \par  IMPRESSION: \par \par  Multilevel lumbar spondylitic changes as detailed above notable for mild to moderate bilateral \par foraminal stenosis at L3-L4, L4-L5 and L5-S1 involving exiting L3, L4, L5 nerve roots, respectively.\par No significant canal stenosis of the lumbar spine. \par \par  Multilevel degenerative endplate changes most prominently at L1-L2 and L3-L4, where are \par degenerative endplate edematous changes, Modic type I with associated vertebral body enhancement. \par \par \par  If there are questions/need for clarification regarding this report, Dr. Delonte Gong can be best \par reached via the patient [facility] system at [email or web address]. \par \par ---------------------------------------------------------------------------------------------------------------------------\par  3/8/22 MRI CERVICAL SPINE \par \par ORDER #: [id number] \par CC: ; ; ; \par End of cc's \par \par CLINICAL HISTORY / REASON FOR EXAM: Hyperreflexia. Fall about one week ago. No \par  head injury. \par \par  TECHNIQUE: MRI of the lumbar spine without IV contrast. \par  Examination consists of noncontrast sagittal and transaxial T1 and T2-weighted \par  images as well as sagittal STIR images of the lumbar spine. \par \par  COMPARISON: None available. \par \par  FINDINGS: \par  The cervical alignment is intact. The vertebral body heights are maintained. \par  Multilevel facet alignment is preserved and the atlantodental interval is \par  within normal limits. No evidence of acute fracture or traumatic malalignment. \par  The vertebral body marrow signal is unremarkable without evidence of edema \par  from occult fracture or marrow infiltrative process. \par \par  The cervical spinal cord is normal in signal intensity and morphology. There \par  is no epidural fluid collection or hemorrhage. \par \par  The spinal canal is congenitally normal. There are multilevel disc desiccation \par  changes with intervertebral disc space narrowing most notable at C4/C5 and \par  C5/C6. Evaluation of the individual levels demonstrates: \par \par  C2/C3: No significant disc herniation, canal stenosis or foraminal narrowing. \par \par  C3/C4: Minimal disc osteophytic ridging with facet arthropathy does not \par  contribute to significant canal stenosis. There is mild to moderate right \par  foraminal narrowing suggested. \par \par  C4/C5: Minimal disc osteophytic ridging, mild facet arthropathy and ligamentum \par  flavum thickening contributes to mild canal stenosis. There is mild bilateral \par  neural foraminal narrowing. \par \par  C5/C6: Mild disc osteophytic ridging, facet arthropathy and ligamentum flavum \par  thickening contributes to mild canal stenosis. There is mild to moderate \par  bilateral neural foraminal narrowing. \par \par  C6/C7 and C7/T1: No significant disc herniation, canal stenosis or neural \par  foraminal narrowing. \par \par  The ligamentous structures are intact. \par \par  The visualized soft tissues of the neck demonstrate no acute abnormality. \par \par  The paraspinal musculature is of normal bulk and morphology for the patient's \par  stated age. \par \par  IMPRESSION: \par  No acute fracture or traumatic malalignment. No cord signal abnormality. \par \par  Degenerative changes with mild canal stenosis C4/C5 and C5/C6. \par \par \par  [FreeTextEntry2] : 23 [FreeTextEntry7] : Lower back pain , radiating down right leg .

## 2023-06-15 NOTE — CONSULT LETTER
[Dear  ___] : Dear  [unfilled], [Please see my note below.] : Please see my note below. [Consult Closing:] : Thank you very much for allowing me to participate in the care of this patient.  If you have any questions, please do not hesitate to contact me. [Sincerely,] : Sincerely, [FreeTextEntry3] : \par Darin Shannon DO, MBA\par Director, Pain Management Center\par  of Anesthesiology\par [employer] [facility] [employer] at [facility]\par \par \par

## 2023-06-26 ENCOUNTER — NON-APPOINTMENT (OUTPATIENT)
Age: 73
End: 2023-06-26

## 2023-06-27 ENCOUNTER — APPOINTMENT (OUTPATIENT)
Dept: PAIN MANAGEMENT | Facility: CLINIC | Age: 73
End: 2023-06-27

## 2023-07-05 ENCOUNTER — TRANSCRIPTION ENCOUNTER (OUTPATIENT)
Age: 73
End: 2023-07-05

## 2023-09-11 ENCOUNTER — APPOINTMENT (OUTPATIENT)
Dept: NEUROLOGY | Facility: CLINIC | Age: 73
End: 2023-09-11
Payer: MEDICARE

## 2023-09-11 VITALS
DIASTOLIC BLOOD PRESSURE: 75 MMHG | OXYGEN SATURATION: 98 % | HEIGHT: 65 IN | HEART RATE: 54 BPM | BODY MASS INDEX: 28.66 KG/M2 | SYSTOLIC BLOOD PRESSURE: 122 MMHG | WEIGHT: 172 LBS

## 2023-09-11 DIAGNOSIS — G50.1 ATYPICAL FACIAL PAIN: ICD-10-CM

## 2023-09-11 DIAGNOSIS — M47.817 SPONDYLOSIS W/OUT MYELOPATHY OR RADICULOPATHY, LUMBOSACRAL REGION: ICD-10-CM

## 2023-09-11 PROCEDURE — 99215 OFFICE O/P EST HI 40 MIN: CPT

## 2023-09-11 RX ORDER — HYDROCORTISONE 25 MG/G
2.5 CREAM TOPICAL
Qty: 30 | Refills: 0 | Status: DISCONTINUED | COMMUNITY
Start: 2023-01-31 | End: 2023-09-11

## 2023-09-11 RX ORDER — GABAPENTIN 300 MG/1
300 CAPSULE ORAL
Qty: 120 | Refills: 5 | Status: DISCONTINUED | COMMUNITY
Start: 2022-08-23 | End: 2023-09-11

## 2023-09-11 RX ORDER — HYDROCORTISONE ACETATE 25 MG/1
25 SUPPOSITORY RECTAL
Qty: 10 | Refills: 0 | Status: DISCONTINUED | COMMUNITY
Start: 2023-01-31 | End: 2023-09-11

## 2023-10-06 ENCOUNTER — RESULT REVIEW (OUTPATIENT)
Age: 73
End: 2023-10-06

## 2023-10-09 ENCOUNTER — TRANSCRIPTION ENCOUNTER (OUTPATIENT)
Age: 73
End: 2023-10-09

## 2023-10-10 ENCOUNTER — APPOINTMENT (OUTPATIENT)
Dept: NEUROLOGY | Facility: CLINIC | Age: 73
End: 2023-10-10
Payer: MEDICARE

## 2023-10-10 VITALS
HEIGHT: 65 IN | SYSTOLIC BLOOD PRESSURE: 131 MMHG | WEIGHT: 167 LBS | BODY MASS INDEX: 27.82 KG/M2 | HEART RATE: 59 BPM | DIASTOLIC BLOOD PRESSURE: 78 MMHG

## 2023-10-10 DIAGNOSIS — R29.898 OTHER SYMPTOMS AND SIGNS INVOLVING THE MUSCULOSKELETAL SYSTEM: ICD-10-CM

## 2023-10-10 DIAGNOSIS — M79.605 LOW BACK PAIN, UNSPECIFIED: ICD-10-CM

## 2023-10-10 DIAGNOSIS — M54.16 RADICULOPATHY, LUMBAR REGION: ICD-10-CM

## 2023-10-10 DIAGNOSIS — R26.89 OTHER ABNORMALITIES OF GAIT AND MOBILITY: ICD-10-CM

## 2023-10-10 DIAGNOSIS — M54.50 LOW BACK PAIN, UNSPECIFIED: ICD-10-CM

## 2023-10-10 DIAGNOSIS — M79.604 LOW BACK PAIN, UNSPECIFIED: ICD-10-CM

## 2023-10-10 PROCEDURE — 99214 OFFICE O/P EST MOD 30 MIN: CPT

## 2023-10-10 RX ORDER — FLUOXETINE HYDROCHLORIDE 10 MG/1
10 CAPSULE ORAL
Qty: 60 | Refills: 0 | Status: DISCONTINUED | COMMUNITY
Start: 2023-05-30 | End: 2023-10-10

## 2023-10-12 ENCOUNTER — TRANSCRIPTION ENCOUNTER (OUTPATIENT)
Age: 73
End: 2023-10-12

## 2023-10-13 ENCOUNTER — RESULT REVIEW (OUTPATIENT)
Age: 73
End: 2023-10-13

## 2023-10-16 ENCOUNTER — TRANSCRIPTION ENCOUNTER (OUTPATIENT)
Age: 73
End: 2023-10-16

## 2023-10-18 ENCOUNTER — TRANSCRIPTION ENCOUNTER (OUTPATIENT)
Age: 73
End: 2023-10-18

## 2023-10-19 ENCOUNTER — RX RENEWAL (OUTPATIENT)
Age: 73
End: 2023-10-19

## 2023-10-19 RX ORDER — LISINOPRIL 2.5 MG/1
2.5 TABLET ORAL DAILY
Qty: 90 | Refills: 3 | Status: ACTIVE | COMMUNITY
Start: 2023-01-24 | End: 1900-01-01

## 2023-10-30 ENCOUNTER — TRANSCRIPTION ENCOUNTER (OUTPATIENT)
Age: 73
End: 2023-10-30

## 2023-11-02 ENCOUNTER — TRANSCRIPTION ENCOUNTER (OUTPATIENT)
Age: 73
End: 2023-11-02

## 2023-11-15 ENCOUNTER — APPOINTMENT (OUTPATIENT)
Dept: HEART AND VASCULAR | Facility: CLINIC | Age: 73
End: 2023-11-15
Payer: MEDICARE

## 2023-11-15 VITALS
OXYGEN SATURATION: 98 % | DIASTOLIC BLOOD PRESSURE: 60 MMHG | HEART RATE: 58 BPM | HEIGHT: 65 IN | BODY MASS INDEX: 27.66 KG/M2 | RESPIRATION RATE: 16 BRPM | SYSTOLIC BLOOD PRESSURE: 120 MMHG | WEIGHT: 166 LBS

## 2023-11-15 DIAGNOSIS — E78.5 HYPERLIPIDEMIA, UNSPECIFIED: ICD-10-CM

## 2023-11-15 DIAGNOSIS — I65.29 OCCLUSION AND STENOSIS OF UNSPECIFIED CAROTID ARTERY: ICD-10-CM

## 2023-11-15 DIAGNOSIS — I25.10 ATHEROSCLEROTIC HEART DISEASE OF NATIVE CORONARY ARTERY W/OUT ANGINA PECTORIS: ICD-10-CM

## 2023-11-15 DIAGNOSIS — I10 ESSENTIAL (PRIMARY) HYPERTENSION: ICD-10-CM

## 2023-11-15 DIAGNOSIS — I48.91 UNSPECIFIED ATRIAL FIBRILLATION: ICD-10-CM

## 2023-11-15 DIAGNOSIS — I44.7 LEFT BUNDLE-BRANCH BLOCK, UNSPECIFIED: ICD-10-CM

## 2023-11-15 PROCEDURE — 99215 OFFICE O/P EST HI 40 MIN: CPT

## 2023-11-15 RX ORDER — GABAPENTIN 300 MG/1
300 CAPSULE ORAL
Refills: 0 | Status: ACTIVE | COMMUNITY
Start: 1900-01-01 | End: 1900-01-01

## 2023-11-15 RX ORDER — FLUOXETINE HYDROCHLORIDE 20 MG/1
20 CAPSULE ORAL
Refills: 0 | Status: ACTIVE | COMMUNITY

## 2023-11-18 PROBLEM — I48.91 ATRIAL FIBRILLATION, UNSPECIFIED TYPE: Status: ACTIVE | Noted: 2022-02-18

## 2023-11-18 PROBLEM — I25.10 ATHEROSCLEROSIS OF CORONARY ARTERY: Status: ACTIVE | Noted: 2022-02-24

## 2023-11-18 PROBLEM — I44.7 LEFT BUNDLE BRANCH BLOCK (LBBB): Status: ACTIVE | Noted: 2022-02-24

## 2023-11-18 PROBLEM — I10 HYPERTENSION, UNSPECIFIED TYPE: Status: ACTIVE | Noted: 2022-02-18

## 2023-11-18 PROBLEM — E78.5 HYPERLIPIDEMIA, UNSPECIFIED HYPERLIPIDEMIA TYPE: Status: ACTIVE | Noted: 2022-02-18

## 2023-11-18 PROBLEM — I65.29 CAROTID ARTERY PLAQUE: Status: ACTIVE | Noted: 2022-02-24

## 2023-11-22 ENCOUNTER — TRANSCRIPTION ENCOUNTER (OUTPATIENT)
Age: 73
End: 2023-11-22

## 2023-11-29 ENCOUNTER — TRANSCRIPTION ENCOUNTER (OUTPATIENT)
Age: 73
End: 2023-11-29

## 2023-12-18 ENCOUNTER — TRANSCRIPTION ENCOUNTER (OUTPATIENT)
Age: 73
End: 2023-12-18

## 2023-12-18 RX ORDER — METHYLPREDNISOLONE 4 MG/1
4 TABLET ORAL
Qty: 1 | Refills: 0 | Status: ACTIVE | COMMUNITY
Start: 2023-12-18 | End: 1900-01-01

## 2023-12-20 ENCOUNTER — TRANSCRIPTION ENCOUNTER (OUTPATIENT)
Age: 73
End: 2023-12-20

## 2024-01-04 ENCOUNTER — RX RENEWAL (OUTPATIENT)
Age: 74
End: 2024-01-04

## 2024-01-23 ENCOUNTER — TRANSCRIPTION ENCOUNTER (OUTPATIENT)
Age: 74
End: 2024-01-23

## 2024-01-23 RX ORDER — CARBIDOPA AND LEVODOPA 25; 100 MG/1; MG/1
25-100 TABLET ORAL
Qty: 90 | Refills: 1 | Status: ACTIVE | COMMUNITY
Start: 2023-10-12 | End: 1900-01-01

## 2024-01-24 ENCOUNTER — TRANSCRIPTION ENCOUNTER (OUTPATIENT)
Age: 74
End: 2024-01-24

## 2024-02-06 ENCOUNTER — TRANSCRIPTION ENCOUNTER (OUTPATIENT)
Age: 74
End: 2024-02-06

## 2024-02-12 ENCOUNTER — TRANSCRIPTION ENCOUNTER (OUTPATIENT)
Age: 74
End: 2024-02-12

## 2024-02-15 ENCOUNTER — TRANSCRIPTION ENCOUNTER (OUTPATIENT)
Age: 74
End: 2024-02-15

## 2024-02-16 ENCOUNTER — TRANSCRIPTION ENCOUNTER (OUTPATIENT)
Age: 74
End: 2024-02-16

## 2024-03-04 ENCOUNTER — TRANSCRIPTION ENCOUNTER (OUTPATIENT)
Age: 74
End: 2024-03-04

## 2024-03-05 ENCOUNTER — TRANSCRIPTION ENCOUNTER (OUTPATIENT)
Age: 74
End: 2024-03-05

## 2024-05-16 ENCOUNTER — APPOINTMENT (OUTPATIENT)
Dept: HEART AND VASCULAR | Facility: CLINIC | Age: 74
End: 2024-05-16

## 2024-06-03 ENCOUNTER — TRANSCRIPTION ENCOUNTER (OUTPATIENT)
Age: 74
End: 2024-06-03

## 2024-06-13 ENCOUNTER — TRANSCRIPTION ENCOUNTER (OUTPATIENT)
Age: 74
End: 2024-06-13

## 2024-09-16 ENCOUNTER — TRANSCRIPTION ENCOUNTER (OUTPATIENT)
Age: 74
End: 2024-09-16

## 2024-09-17 ENCOUNTER — TRANSCRIPTION ENCOUNTER (OUTPATIENT)
Age: 74
End: 2024-09-17

## 2024-12-15 NOTE — REASON FOR VISIT
Next HD MWF    Dr Nelson   [Initial Consultation] : an initial pain management consultation [FreeTextEntry2] : Referred by Dr. Morocho